# Patient Record
Sex: FEMALE | Race: WHITE | NOT HISPANIC OR LATINO | Employment: UNEMPLOYED | URBAN - METROPOLITAN AREA
[De-identification: names, ages, dates, MRNs, and addresses within clinical notes are randomized per-mention and may not be internally consistent; named-entity substitution may affect disease eponyms.]

---

## 2022-04-06 ENCOUNTER — APPOINTMENT (OUTPATIENT)
Dept: CT IMAGING | Facility: HOSPITAL | Age: 57
End: 2022-04-06

## 2022-04-06 ENCOUNTER — APPOINTMENT (OUTPATIENT)
Dept: GENERAL RADIOLOGY | Facility: HOSPITAL | Age: 57
End: 2022-04-06

## 2022-04-06 ENCOUNTER — HOSPITAL ENCOUNTER (OUTPATIENT)
Facility: HOSPITAL | Age: 57
Setting detail: OBSERVATION
LOS: 1 days | Discharge: HOME | End: 2022-04-10
Attending: INTERNAL MEDICINE | Admitting: INTERNAL MEDICINE

## 2022-04-06 DIAGNOSIS — S82.852A TRIMALLEOLAR FRACTURE OF ANKLE, CLOSED, LEFT, INITIAL ENCOUNTER: Primary | ICD-10-CM

## 2022-04-06 DIAGNOSIS — E87.6 HYPOKALEMIA: ICD-10-CM

## 2022-04-06 PROBLEM — M25.572 ACUTE LEFT ANKLE PAIN: Status: ACTIVE | Noted: 2022-04-06
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
## 2022-04-06 LAB
ABO GROUP BLD: NORMAL
ALBUMIN SERPL-MCNC: 3.4 G/DL (ref 3.5–5.2)
ALBUMIN/GLOB SERPL: 1.4 G/DL
ALP SERPL-CCNC: 91 U/L (ref 39–117)
ALT SERPL W P-5'-P-CCNC: 37 U/L (ref 1–33)
ANION GAP SERPL CALCULATED.3IONS-SCNC: 10 MMOL/L (ref 5–15)
AST SERPL-CCNC: 33 U/L (ref 1–32)
BASOPHILS # BLD AUTO: 0 10*3/MM3 (ref 0–0.2)
BASOPHILS NFR BLD AUTO: 0.4 % (ref 0–1.5)
BILIRUB SERPL-MCNC: 0.4 MG/DL (ref 0–1.2)
BLD GP AB SCN SERPL QL: NEGATIVE
BUN SERPL-MCNC: 10 MG/DL (ref 6–20)
BUN/CREAT SERPL: 13.5 (ref 7–25)
CALCIUM SPEC-SCNC: 7.5 MG/DL (ref 8.6–10.5)
CHLORIDE SERPL-SCNC: 107 MMOL/L (ref 98–107)
CO2 SERPL-SCNC: 21 MMOL/L (ref 22–29)
CREAT SERPL-MCNC: 0.74 MG/DL (ref 0.57–1)
DEPRECATED RDW RBC AUTO: 38.1 FL (ref 37–54)
EGFRCR SERPLBLD CKD-EPI 2021: 93.9 ML/MIN/1.73
EOSINOPHIL # BLD AUTO: 0.1 10*3/MM3 (ref 0–0.4)
EOSINOPHIL NFR BLD AUTO: 1.1 % (ref 0.3–6.2)
ERYTHROCYTE [DISTWIDTH] IN BLOOD BY AUTOMATED COUNT: 12.5 % (ref 12.3–15.4)
GLOBULIN UR ELPH-MCNC: 2.4 GM/DL
GLUCOSE SERPL-MCNC: 117 MG/DL (ref 65–99)
HCT VFR BLD AUTO: 36.8 % (ref 34–46.6)
HGB BLD-MCNC: 13 G/DL (ref 12–15.9)
LYMPHOCYTES # BLD AUTO: 0.9 10*3/MM3 (ref 0.7–3.1)
LYMPHOCYTES NFR BLD AUTO: 16.2 % (ref 19.6–45.3)
MAGNESIUM SERPL-MCNC: 1.6 MG/DL (ref 1.6–2.6)
MCH RBC QN AUTO: 30.7 PG (ref 26.6–33)
MCHC RBC AUTO-ENTMCNC: 35.2 G/DL (ref 31.5–35.7)
MCV RBC AUTO: 87.2 FL (ref 79–97)
MONOCYTES # BLD AUTO: 0.4 10*3/MM3 (ref 0.1–0.9)
MONOCYTES NFR BLD AUTO: 7.5 % (ref 5–12)
NEUTROPHILS NFR BLD AUTO: 4.2 10*3/MM3 (ref 1.7–7)
NEUTROPHILS NFR BLD AUTO: 74.8 % (ref 42.7–76)
NRBC BLD AUTO-RTO: 0.2 /100 WBC (ref 0–0.2)
PLATELET # BLD AUTO: 202 10*3/MM3 (ref 140–450)
PMV BLD AUTO: 8.1 FL (ref 6–12)
POTASSIUM SERPL-SCNC: 2.9 MMOL/L (ref 3.5–5.2)
PROT SERPL-MCNC: 5.8 G/DL (ref 6–8.5)
QT INTERVAL: 392 MS
RBC # BLD AUTO: 4.22 10*6/MM3 (ref 3.77–5.28)
RH BLD: POSITIVE
SARS-COV-2 RNA PNL SPEC NAA+PROBE: NOT DETECTED
SODIUM SERPL-SCNC: 138 MMOL/L (ref 136–145)
T&S EXPIRATION DATE: NORMAL
WBC NRBC COR # BLD: 5.6 10*3/MM3 (ref 3.4–10.8)

## 2022-04-06 PROCEDURE — 25010000002 ONDANSETRON PER 1 MG: Performed by: NURSE PRACTITIONER

## 2022-04-06 PROCEDURE — 36415 COLL VENOUS BLD VENIPUNCTURE: CPT | Performed by: NURSE PRACTITIONER

## 2022-04-06 PROCEDURE — 25010000002 KETOROLAC TROMETHAMINE PER 15 MG: Performed by: NURSE PRACTITIONER

## 2022-04-06 PROCEDURE — 0 POTASSIUM CHLORIDE 10 MEQ/100ML SOLUTION: Performed by: NURSE PRACTITIONER

## 2022-04-06 PROCEDURE — 86900 BLOOD TYPING SEROLOGIC ABO: CPT

## 2022-04-06 PROCEDURE — 80053 COMPREHEN METABOLIC PANEL: CPT | Performed by: NURSE PRACTITIONER

## 2022-04-06 PROCEDURE — 83735 ASSAY OF MAGNESIUM: CPT | Performed by: NURSE PRACTITIONER

## 2022-04-06 PROCEDURE — 99219 PR INITIAL OBSERVATION CARE/DAY 50 MINUTES: CPT | Performed by: NURSE PRACTITIONER

## 2022-04-06 PROCEDURE — 86901 BLOOD TYPING SEROLOGIC RH(D): CPT | Performed by: NURSE PRACTITIONER

## 2022-04-06 PROCEDURE — G0378 HOSPITAL OBSERVATION PER HR: HCPCS

## 2022-04-06 PROCEDURE — 96365 THER/PROPH/DIAG IV INF INIT: CPT

## 2022-04-06 PROCEDURE — 96361 HYDRATE IV INFUSION ADD-ON: CPT

## 2022-04-06 PROCEDURE — 86850 RBC ANTIBODY SCREEN: CPT | Performed by: NURSE PRACTITIONER

## 2022-04-06 PROCEDURE — 86901 BLOOD TYPING SEROLOGIC RH(D): CPT

## 2022-04-06 PROCEDURE — 96376 TX/PRO/DX INJ SAME DRUG ADON: CPT

## 2022-04-06 PROCEDURE — 99285 EMERGENCY DEPT VISIT HI MDM: CPT

## 2022-04-06 PROCEDURE — 25010000002 HYDROMORPHONE 1 MG/ML SOLUTION: Performed by: INTERNAL MEDICINE

## 2022-04-06 PROCEDURE — 93010 ELECTROCARDIOGRAM REPORT: CPT | Performed by: INTERNAL MEDICINE

## 2022-04-06 PROCEDURE — 73700 CT LOWER EXTREMITY W/O DYE: CPT

## 2022-04-06 PROCEDURE — 85025 COMPLETE CBC W/AUTO DIFF WBC: CPT | Performed by: NURSE PRACTITIONER

## 2022-04-06 PROCEDURE — 93005 ELECTROCARDIOGRAM TRACING: CPT | Performed by: NURSE PRACTITIONER

## 2022-04-06 PROCEDURE — U0003 INFECTIOUS AGENT DETECTION BY NUCLEIC ACID (DNA OR RNA); SEVERE ACUTE RESPIRATORY SYNDROME CORONAVIRUS 2 (SARS-COV-2) (CORONAVIRUS DISEASE [COVID-19]), AMPLIFIED PROBE TECHNIQUE, MAKING USE OF HIGH THROUGHPUT TECHNOLOGIES AS DESCRIBED BY CMS-2020-01-R: HCPCS | Performed by: INTERNAL MEDICINE

## 2022-04-06 PROCEDURE — C9803 HOPD COVID-19 SPEC COLLECT: HCPCS

## 2022-04-06 PROCEDURE — 96375 TX/PRO/DX INJ NEW DRUG ADDON: CPT

## 2022-04-06 PROCEDURE — 25010000002 HYDROMORPHONE 1 MG/ML SOLUTION

## 2022-04-06 PROCEDURE — 73600 X-RAY EXAM OF ANKLE: CPT

## 2022-04-06 PROCEDURE — 73610 X-RAY EXAM OF ANKLE: CPT

## 2022-04-06 PROCEDURE — 71045 X-RAY EXAM CHEST 1 VIEW: CPT

## 2022-04-06 PROCEDURE — 99203 OFFICE O/P NEW LOW 30 MIN: CPT | Performed by: ORTHOPAEDIC SURGERY

## 2022-04-06 PROCEDURE — 86900 BLOOD TYPING SEROLOGIC ABO: CPT | Performed by: NURSE PRACTITIONER

## 2022-04-06 RX ORDER — LEVOTHYROXINE SODIUM 0.03 MG/1
25 TABLET ORAL DAILY
COMMUNITY

## 2022-04-06 RX ORDER — HYDROCODONE BITARTRATE AND ACETAMINOPHEN 5; 325 MG/1; MG/1
1 TABLET ORAL EVERY 4 HOURS PRN
Status: DISCONTINUED | OUTPATIENT
Start: 2022-04-06 | End: 2022-04-10 | Stop reason: HOSPADM

## 2022-04-06 RX ORDER — POTASSIUM CHLORIDE 7.45 MG/ML
10 INJECTION INTRAVENOUS ONCE
Status: DISCONTINUED | OUTPATIENT
Start: 2022-04-06 | End: 2022-04-06

## 2022-04-06 RX ORDER — ADALIMUMAB 40MG/0.4ML
40 KIT SUBCUTANEOUS ONCE
COMMUNITY
End: 2022-07-07

## 2022-04-06 RX ORDER — MAGNESIUM SULFATE HEPTAHYDRATE 40 MG/ML
2 INJECTION, SOLUTION INTRAVENOUS AS NEEDED
Status: DISCONTINUED | OUTPATIENT
Start: 2022-04-06 | End: 2022-04-10 | Stop reason: HOSPADM

## 2022-04-06 RX ORDER — POTASSIUM CHLORIDE 20 MEQ/1
40 TABLET, EXTENDED RELEASE ORAL AS NEEDED
Status: DISCONTINUED | OUTPATIENT
Start: 2022-04-06 | End: 2022-04-10 | Stop reason: HOSPADM

## 2022-04-06 RX ORDER — ALUMINA, MAGNESIA, AND SIMETHICONE 2400; 2400; 240 MG/30ML; MG/30ML; MG/30ML
15 SUSPENSION ORAL EVERY 6 HOURS PRN
Status: DISCONTINUED | OUTPATIENT
Start: 2022-04-06 | End: 2022-04-10 | Stop reason: HOSPADM

## 2022-04-06 RX ORDER — BISACODYL 5 MG/1
5 TABLET, DELAYED RELEASE ORAL DAILY PRN
Status: DISCONTINUED | OUTPATIENT
Start: 2022-04-06 | End: 2022-04-10 | Stop reason: HOSPADM

## 2022-04-06 RX ORDER — SODIUM CHLORIDE 0.9 % (FLUSH) 0.9 %
10 SYRINGE (ML) INJECTION AS NEEDED
Status: DISCONTINUED | OUTPATIENT
Start: 2022-04-06 | End: 2022-04-08 | Stop reason: SDUPTHER

## 2022-04-06 RX ORDER — POLYETHYLENE GLYCOL 3350 17 G/17G
17 POWDER, FOR SOLUTION ORAL DAILY PRN
Status: DISCONTINUED | OUTPATIENT
Start: 2022-04-06 | End: 2022-04-10 | Stop reason: HOSPADM

## 2022-04-06 RX ORDER — POTASSIUM CHLORIDE 7.45 MG/ML
10 INJECTION INTRAVENOUS ONCE
Status: COMPLETED | OUTPATIENT
Start: 2022-04-06 | End: 2022-04-06

## 2022-04-06 RX ORDER — POTASSIUM CHLORIDE 7.45 MG/ML
10 INJECTION INTRAVENOUS
Status: DISCONTINUED | OUTPATIENT
Start: 2022-04-06 | End: 2022-04-10 | Stop reason: HOSPADM

## 2022-04-06 RX ORDER — ETOMIDATE 2 MG/ML
0.3 INJECTION INTRAVENOUS ONCE
Status: COMPLETED | OUTPATIENT
Start: 2022-04-06 | End: 2022-04-06

## 2022-04-06 RX ORDER — NITROGLYCERIN 0.4 MG/1
0.4 TABLET SUBLINGUAL
Status: DISCONTINUED | OUTPATIENT
Start: 2022-04-06 | End: 2022-04-10 | Stop reason: HOSPADM

## 2022-04-06 RX ORDER — ONDANSETRON 2 MG/ML
4 INJECTION INTRAMUSCULAR; INTRAVENOUS EVERY 6 HOURS PRN
Status: DISCONTINUED | OUTPATIENT
Start: 2022-04-06 | End: 2022-04-10 | Stop reason: HOSPADM

## 2022-04-06 RX ORDER — KETOROLAC TROMETHAMINE 15 MG/ML
15 INJECTION, SOLUTION INTRAMUSCULAR; INTRAVENOUS EVERY 6 HOURS PRN
Status: DISPENSED | OUTPATIENT
Start: 2022-04-06 | End: 2022-04-10

## 2022-04-06 RX ORDER — SODIUM CHLORIDE 0.9 % (FLUSH) 0.9 %
10 SYRINGE (ML) INJECTION EVERY 12 HOURS SCHEDULED
Status: DISCONTINUED | OUTPATIENT
Start: 2022-04-06 | End: 2022-04-10 | Stop reason: HOSPADM

## 2022-04-06 RX ORDER — ACETAMINOPHEN 160 MG/5ML
650 SOLUTION ORAL EVERY 4 HOURS PRN
Status: DISCONTINUED | OUTPATIENT
Start: 2022-04-06 | End: 2022-04-10 | Stop reason: HOSPADM

## 2022-04-06 RX ORDER — OXYCODONE HYDROCHLORIDE 5 MG/1
10 TABLET ORAL EVERY 4 HOURS PRN
Status: DISCONTINUED | OUTPATIENT
Start: 2022-04-06 | End: 2022-04-10 | Stop reason: HOSPADM

## 2022-04-06 RX ORDER — MAGNESIUM SULFATE 1 G/100ML
1 INJECTION INTRAVENOUS AS NEEDED
Status: DISCONTINUED | OUTPATIENT
Start: 2022-04-06 | End: 2022-04-10 | Stop reason: HOSPADM

## 2022-04-06 RX ORDER — ACETAMINOPHEN 650 MG/1
650 SUPPOSITORY RECTAL EVERY 4 HOURS PRN
Status: DISCONTINUED | OUTPATIENT
Start: 2022-04-06 | End: 2022-04-10 | Stop reason: HOSPADM

## 2022-04-06 RX ORDER — ONDANSETRON 4 MG/1
4 TABLET, FILM COATED ORAL EVERY 6 HOURS PRN
Status: DISCONTINUED | OUTPATIENT
Start: 2022-04-06 | End: 2022-04-10 | Stop reason: HOSPADM

## 2022-04-06 RX ORDER — AMOXICILLIN 250 MG
2 CAPSULE ORAL 2 TIMES DAILY
Status: DISCONTINUED | OUTPATIENT
Start: 2022-04-06 | End: 2022-04-10 | Stop reason: HOSPADM

## 2022-04-06 RX ORDER — CHOLECALCIFEROL (VITAMIN D3) 125 MCG
5 CAPSULE ORAL NIGHTLY PRN
Status: DISCONTINUED | OUTPATIENT
Start: 2022-04-06 | End: 2022-04-10 | Stop reason: HOSPADM

## 2022-04-06 RX ORDER — ONDANSETRON 2 MG/ML
4 INJECTION INTRAMUSCULAR; INTRAVENOUS ONCE
Status: COMPLETED | OUTPATIENT
Start: 2022-04-06 | End: 2022-04-06

## 2022-04-06 RX ORDER — POTASSIUM CHLORIDE 1.5 G/1.77G
40 POWDER, FOR SOLUTION ORAL AS NEEDED
Status: DISCONTINUED | OUTPATIENT
Start: 2022-04-06 | End: 2022-04-10 | Stop reason: HOSPADM

## 2022-04-06 RX ORDER — SODIUM CHLORIDE, SODIUM LACTATE, POTASSIUM CHLORIDE, CALCIUM CHLORIDE 600; 310; 30; 20 MG/100ML; MG/100ML; MG/100ML; MG/100ML
125 INJECTION, SOLUTION INTRAVENOUS CONTINUOUS
Status: DISCONTINUED | OUTPATIENT
Start: 2022-04-07 | End: 2022-04-08

## 2022-04-06 RX ORDER — BISACODYL 10 MG
10 SUPPOSITORY, RECTAL RECTAL DAILY PRN
Status: DISCONTINUED | OUTPATIENT
Start: 2022-04-06 | End: 2022-04-10 | Stop reason: HOSPADM

## 2022-04-06 RX ORDER — OXYCODONE HYDROCHLORIDE 5 MG/1
5 TABLET ORAL EVERY 4 HOURS PRN
Status: DISCONTINUED | OUTPATIENT
Start: 2022-04-06 | End: 2022-04-10 | Stop reason: HOSPADM

## 2022-04-06 RX ORDER — LANOLIN ALCOHOL/MO/W.PET/CERES
3 CREAM (GRAM) TOPICAL
COMMUNITY

## 2022-04-06 RX ORDER — ACETAMINOPHEN 325 MG/1
650 TABLET ORAL EVERY 4 HOURS PRN
Status: DISCONTINUED | OUTPATIENT
Start: 2022-04-06 | End: 2022-04-10 | Stop reason: HOSPADM

## 2022-04-06 RX ADMIN — Medication 1 MG: at 09:10

## 2022-04-06 RX ADMIN — ONDANSETRON 4 MG: 2 INJECTION INTRAMUSCULAR; INTRAVENOUS at 20:56

## 2022-04-06 RX ADMIN — ONDANSETRON 4 MG: 2 INJECTION INTRAMUSCULAR; INTRAVENOUS at 09:11

## 2022-04-06 RX ADMIN — HYDROMORPHONE HYDROCHLORIDE 0.5 MG: 1 INJECTION, SOLUTION INTRAMUSCULAR; INTRAVENOUS; SUBCUTANEOUS at 22:18

## 2022-04-06 RX ADMIN — SODIUM CHLORIDE, POTASSIUM CHLORIDE, SODIUM LACTATE AND CALCIUM CHLORIDE 75 ML/HR: 600; 310; 30; 20 INJECTION, SOLUTION INTRAVENOUS at 14:04

## 2022-04-06 RX ADMIN — Medication 10 ML: at 20:50

## 2022-04-06 RX ADMIN — HYDROMORPHONE HYDROCHLORIDE 1 MG: 1 INJECTION, SOLUTION INTRAMUSCULAR; INTRAVENOUS; SUBCUTANEOUS at 15:59

## 2022-04-06 RX ADMIN — Medication 10 ML: at 13:30

## 2022-04-06 RX ADMIN — ETOMIDATE 10 MG: 2 INJECTION, SOLUTION INTRAVENOUS at 09:56

## 2022-04-06 RX ADMIN — SENNOSIDES AND DOCUSATE SODIUM 2 TABLET: 50; 8.6 TABLET ORAL at 11:18

## 2022-04-06 RX ADMIN — POTASSIUM CHLORIDE 10 MEQ: 7.46 INJECTION, SOLUTION INTRAVENOUS at 10:28

## 2022-04-06 RX ADMIN — SODIUM CHLORIDE, POTASSIUM CHLORIDE, SODIUM LACTATE AND CALCIUM CHLORIDE 75 ML/HR: 600; 310; 30; 20 INJECTION, SOLUTION INTRAVENOUS at 23:09

## 2022-04-06 RX ADMIN — POTASSIUM CHLORIDE 40 MEQ: 20 TABLET, EXTENDED RELEASE ORAL at 15:03

## 2022-04-06 RX ADMIN — KETOROLAC TROMETHAMINE 15 MG: 15 INJECTION, SOLUTION INTRAMUSCULAR; INTRAVENOUS at 13:27

## 2022-04-06 RX ADMIN — SENNOSIDES AND DOCUSATE SODIUM 2 TABLET: 50; 8.6 TABLET ORAL at 20:49

## 2022-04-06 RX ADMIN — SODIUM CHLORIDE 1000 ML: 9 INJECTION, SOLUTION INTRAVENOUS at 09:22

## 2022-04-06 RX ADMIN — OXYCODONE 10 MG: 5 TABLET ORAL at 23:08

## 2022-04-06 RX ADMIN — HYDROCODONE BITARTRATE AND ACETAMINOPHEN 1 TABLET: 5; 325 TABLET ORAL at 20:49

## 2022-04-06 RX ADMIN — OXYCODONE 10 MG: 5 TABLET ORAL at 18:31

## 2022-04-06 RX ADMIN — ACETAMINOPHEN 650 MG: 325 TABLET ORAL at 18:20

## 2022-04-06 RX ADMIN — POTASSIUM CHLORIDE 10 MEQ: 7.46 INJECTION, SOLUTION INTRAVENOUS at 13:03

## 2022-04-06 RX ADMIN — HYDROCODONE BITARTRATE AND ACETAMINOPHEN 1 TABLET: 5; 325 TABLET ORAL at 13:26

## 2022-04-06 RX ADMIN — HYDROMORPHONE HYDROCHLORIDE 1 MG: 1 INJECTION, SOLUTION INTRAMUSCULAR; INTRAVENOUS; SUBCUTANEOUS at 09:10

## 2022-04-06 RX ADMIN — OXYCODONE 5 MG: 5 TABLET ORAL at 16:45

## 2022-04-06 NOTE — ED PROVIDER NOTES
Subjective   56-year-old  female presents emergency room via EMS for complaint of left ankle pain with obvious deformity.  Patient states that she missed a step and slipped and fell on her left ankle.  Patient states that she lives in [facility] and called 911 and the EMS crew brought her to [facility].  Onset: About an hour prior to arrival  Location: Left ankle  Duration: 1 to 2 hours  Character: Sharp pains in calf  Aggravating/Alleviating Factors: Fell down 1 step/pain medicine in route given by EMS  Radiation: Into upper left calf  Severity:  severe            Review of Systems   Constitutional: Positive for activity change.   Musculoskeletal: Positive for arthralgias and myalgias.   Skin: Positive for color change and wound.   Neurological: Positive for weakness and numbness.   All other systems reviewed and are negative.      History reviewed. No pertinent past medical history.    No Known Allergies    History reviewed. No pertinent surgical history.    History reviewed. No pertinent family history.    Social History     Socioeconomic History   • Marital status:            Objective   Physical Exam  Constitutional:       General: She is in acute distress.   HENT:      Head: Normocephalic and atraumatic.      Mouth/Throat:      Mouth: Mucous membranes are dry.      Pharynx: Oropharynx is clear.   Eyes:      Extraocular Movements: Extraocular movements intact.      Conjunctiva/sclera: Conjunctivae normal.      Pupils: Pupils are equal, round, and reactive to light.   Musculoskeletal:        Legs:    Neurological:      Mental Status: She is alert.      GCS: GCS eye subscore is 4. GCS verbal subscore is 5. GCS motor subscore is 6.      Sensory: Sensation is intact.      Motor: Weakness present.         FX Dislocation    Date/Time: 4/6/2022 9:31 AM  Performed by: Toyin Barrera APRN  Authorized by: Toyin Barrera APRN     Consent:     Consent obtained:  Verbal    Consent given  by:  Patient    Risks, benefits, and alternatives were discussed: yes      Risks discussed:  Nerve damage, pain and vascular damage    Alternatives discussed:  Referral and observation  Universal protocol:     Imaging studies available: yes      Site/side marked: yes      Immediately prior to procedure, a time out was called: yes      Patient identity confirmed:  Verbally with patient  Injury:     Injury location:  Ankle    Ankle injury location:  L ankle    Ankle fracture type: trimalleolar    Pre-procedure details:     Distal neurologic exam:  Weakness    Distal perfusion: distal pulses strong and brisk capillary refill      Range of motion: reduced    Sedation:     Sedation type:  Moderate sedation  Anesthesia:     Anesthesia method:  None  Procedure details:     Manipulation performed: yes      Skin traction used: yes      Reduction successful: yes      Immobilization:  Splint    Splint type:  Short leg    Supplies used:  Cotton padding and fiberglass  Post-procedure details:     Distal neurologic exam:  Normal    Distal perfusion: distal pulses strong and brisk capillary refill      Procedure completion:  Tolerated well, no immediate complications    Fracture management: I provided definitive fracture management                 ED Course  ED Course as of 04/06/22 1034   Wed Apr 06, 2022   1004 Dr Vora called for consult/referral. [CT]   1018 Dr Vora returns call and agrees to consult. [CT]   1026 Dr dimas at bedside to consult. [CT]   1028 Martha NP with Hospitalist group returned phone call and agrees to accept admission under Dr Orta. [CT]      ED Course User Index  [CT] Toyin Barrera, APRN      PIV obtained enroute via EMS. 1mg of dialudid given for pain relief upon my assessment.    Reduction procedure performed assisted by Fawad Ma MD.  (see above procedure note).    XR Ankle 2 View Left    Result Date: 4/6/2022  Interval reduction of trimalleolar fracture dislocation with significantly improved  alignment at all 3 fractures and reduction of anterior dislocation.  Electronically Signed By-Taran Eaton MD On:4/6/2022 10:29 AM This report was finalized on [id number] by  Taran Eaton MD.    XR Ankle 3+ View Left    Result Date: 4/6/2022  Trimalleolar fracture dislocation.  Electronically Signed By-Taran Eaton MD On:4/6/2022 9:37 AM This report was finalized on [id number] by  Taran Eaton MD.    XR Chest 1 View    Result Date: 4/6/2022  No acute cardiopulmonary abnormality.  Electronically Signed By-Taran Eaton MD On:4/6/2022 10:29 AM This report was finalized on [id number] by  Taran Eaton MD.      Labs Reviewed   COMPREHENSIVE METABOLIC PANEL - Abnormal; Notable for the following components:       Result Value    Glucose 117 (*)     Potassium 2.9 (*)     CO2 21.0 (*)     Calcium 7.5 (*)     Total Protein 5.8 (*)     Albumin 3.40 (*)     ALT (SGPT) 37 (*)     AST (SGOT) 33 (*)     All other components within normal limits    Narrative:     GFR Normal >60  Chronic Kidney Disease <60  Kidney Failure <15     CBC WITH AUTO DIFFERENTIAL - Abnormal; Notable for the following components:    Lymphocyte % 16.2 (*)     All other components within normal limits   MAGNESIUM - Normal   COVID PRE-OP / PRE-PROCEDURE SCREENING ORDER (NO ISOLATION)    Narrative:     The following orders were created for panel order COVID PRE-OP / PRE-PROCEDURE SCREENING ORDER (NO ISOLATION) - Swab, Nasopharynx.  Procedure                               Abnormality         Status                     ---------                               -----------         ------                     COVID-19,CEPHEID/MARIO,CO...[[id number]]                      In process                   Please view results for these tests on the individual orders.   COVID-19,CEPHEID/MARIO,COR/MAKAYLA/PAD/FIORELLA IN-HOUSE,NP SWAB IN TRANSPORT MEDIA 3-4 HR TAT, RT-PCR   TYPE AND SCREEN   BB ARMBAND CHECK   CBC AND DIFFERENTIAL    Narrative:     The following  orders were created for panel order CBC & Differential.  Procedure                               Abnormality         Status                     ---------                               -----------         ------                     CBC Auto Differential[[id number]]        Abnormal            Final result                 Please view results for these tests on the individual orders.     K+ level was found to be 2.9; potassium protocol replacement initiated in ED, prior to admission.    EKG obtained for pre-op purposes; rate of 71.                                           MDM    Final diagnoses:   Trimalleolar fracture of ankle, closed, left, initial encounter   Hypokalemia       ED Disposition  ED Disposition     ED Disposition   Decision to Admit    Condition   --    Comment   Level of Care: Med/Surg [1]   Admitting Physician: SHELLI PHILLIPS [[id number]]   Attending Physician: SHELLI PHILLIPS [[id number]]               No follow-up provider specified.       Medication List      No changes were made to your prescriptions during this visit.          Toyin Barrera, APRN  04/06/22 1034

## 2022-04-06 NOTE — CONSULTS
"     Patient ID: Chante Wilson is a 56 y.o. female.    Chief Complaint:    Chief Complaint   Patient presents with   • Ankle Pain       HPI:  This is a 56-year-old female who fell earlier today resulted in a closed deformity of the left ankle.  She was closed reduced and splinted in the emergency room.  No other acute complaints  Past Medical History:   Diagnosis Date   • Essential hypertension    • Hypothyroidism (acquired)    • Psoriasis    • Tobacco abuse        History reviewed. No pertinent surgical history.    Family History   Problem Relation Age of Onset   • Cancer Mother 38          Social History     Occupational History   • Not on file   Tobacco Use   • Smoking status: Current Every Day Smoker     Packs/day: 0.25     Types: Cigarettes   • Smokeless tobacco: Never Used   Vaping Use   • Vaping Use: Never used   Substance and Sexual Activity   • Alcohol use: Yes     Comment: \"occasional\"   • Drug use: Never   • Sexual activity: Defer      Review of Systems   Cardiovascular: Negative for chest pain.   Musculoskeletal: Positive for arthralgias.       Objective:    /55   Pulse 66   Temp 97.9 °F (36.6 °C) (Oral)   Resp 16   Ht 170.2 cm (67\")   Wt 86.2 kg (190 lb)   SpO2 98%   BMI 29.76 kg/m²     Physical Examination:  Left ankle is in a well fitting short leg splint toes are warm and perfused with intact gross sensation.  No pain wiggling the toes.  Calf is soft.    Imaging:  X-rays reviewed demonstrate displaced trimalleolar ankle fracture successfully closed reduced    Assessment:  Unstable left ankle fracture    Plan:   I recommend open reduction internal fixation.  We will evaluate the soft tissues in the next 24 hours to determine if surgery is An option on Friday.  Will admit for elevation and pain control.  She remained nonweightbearing.  Mechanical DVT prophylaxis and if surgery planned Friday will be n.p.o. Thursday night  Risks and benefits of surgery including bleeding, scar, infection, " stiffness, nerve tendon or artery damage, malunion,nonunion, DVT, repeat surgery including hardware removal, loss of life or limb were discussed. All questions were answered and addressed    Disclaimer: Part of this note may be an electronic transcription/translation of spoken language to printed text using the Dragon Dictation System

## 2022-04-06 NOTE — CASE MANAGEMENT/SOCIAL WORK
Discharge Planning Assessment   Mayur     Patient Name: Chante Wilson  MRN: [id number]  Today's Date: 4/6/2022    Admit Date: 4/6/2022     Discharge Needs Assessment     Row Name 04/06/22 1422       Living Environment    People in Home spouse    Name(s) of People in Home Donovan - she reports he works out of town during the week    Current Living Arrangements home    Primary Care Provided by self    Provides Primary Care For no one    Family Caregiver if Needed spouse;other (see comments)    Family Caregiver Names Pt states spouse is attempting to be able to take off of work next week to stay with her.He normally works out of town during the week. She states she thinks her son can stay with her and assist her if needed    Quality of Family Relationships supportive    Able to Return to Prior Arrangements yes       Resource/Environmental Concerns    Resource/Environmental Concerns home accessibility    Home Accessibility Concerns stairs to access bedroom or bathroom    Transportation Concerns none       Transition Planning    Patient/Family Anticipates Transition to home with family    Patient/Family Anticipated Services at Transition none    Transportation Anticipated family or friend will provide       Discharge Needs Assessment    Equipment Currently Used at Home none    Concerns to be Addressed denies needs/concerns at this time    Anticipated Changes Related to Illness inability to care for someone else    Equipment Needed After Discharge other (see comments)  Anticipate need for walker,knee scooter,  or crutches at dc    Current Discharge Risk physical impairment               Discharge Plan     Row Name 04/06/22 2966       Plan    Plan Home with family. Anticipated need for DME post-op    Patient/Family in Agreement with Plan yes    Plan Comments  spoke with patient.She confirms PCP and pharmacy. Reports she is normally independent with ADLs,and uses no dme.Lives with spouse but he works during the  week.Pt reports spouse is going to attempt to take off of work next week so he can be present to assist her.She said she believes her son can assist her if spouse is unable to do so. She said she plans to sleep on couch because bedroom is on second floor. Secure chat sent to PT Nicole Birch that navigating steps may need to be addressed prior to dc.Pt denies other dc needs or concerns at this time. Anticipates surgery on Friday.              Continued Care and Services - Admitted Since 4/6/2022    Coordination has not been started for this encounter.          Demographic Summary     Row Name 04/06/22 1421       General Information    Admission Type observation    Arrived From home    Referral Source hospital clinician/department    Reason for Consult discharge planning    Preferred Language English       Contact Information    Permission Granted to Share Info With                Functional Status     Row Name 04/06/22 1421       Functional Status    Usual Activity Tolerance good    Current Activity Tolerance poor  Not ambulating currently due to ankle fracture       Functional Status, IADL    Medications independent    Meal Preparation independent    Housekeeping independent    Laundry independent    Shopping independent                Zeynep Moeller RN, [facility]  Office: [phone]  Fax: [phone]  [email or web address]      I met with patient in room wearing PPE: mask and goggles.     Maintained distance greater than six feet and spent </=15 minutes in the room        Zeynep Moeller RN

## 2022-04-06 NOTE — SIGNIFICANT NOTE
04/06/22 1112   OTHER   Discipline physical therapist   Rehab Time/Intention   Session Not Performed unable to evaluate, medical status change;other (see comments)  (ankle fx; waiting on ortho consult)   Recommendation   PT - Next Appointment 04/07/22

## 2022-04-06 NOTE — H&P
[facility] Medicine Services      Patient Name: Chante Wilson  : 1965  MRN: [id number]  Primary Care Physician:  Alex Grullon DO  Date of admission: 2022      Subjective      Chief Complaint:  Left ankle pain    History of Present Illness: Chante Wilson is a 56 y.o. female with a history of hypertension, hypothyroidism, and psoriasis who presented to the ER at [facility] on 2022 complaining of left ankle pain. The patient states she missed a step and fell this morning. She states she cannot bear weight on her left side. She states the pain is severe and worse with movement. She denies any alleviating factors. She denies any other complaints.     The patient states she smokes a couple cigarettes daily. She reports occasional alcohol intake. She denies any illicit drug use. She reports a family history of cancer.    Workup in the ER revealed left trimalleolar fracture. The patient was given Dilaudid for pain relief. She was given conscious sedation and her fracture was reduced by the ER MD. A splint cast was applied. Orthopedic surgery was consulted. The patient was also noted to have hypokalemia so she was started on replacement. She was admitted to the Hospitalist group for further evaluation.       Review of Systems   Musculoskeletal: Positive for falls and joint pain.        Left ankle   All other systems reviewed and are negative.       Personal History     Past Medical History:   Diagnosis Date   • Essential hypertension    • Hypothyroidism (acquired)    • Psoriasis    • Tobacco abuse        History reviewed. No pertinent surgical history.    Family History: family history includes Cancer (age of onset: 38) in her mother. Otherwise pertinent FHx was reviewed and not pertinent to current issue.    Social History:  reports that she has been smoking cigarettes. She has been smoking about 0.25 packs per day. She has never used smokeless tobacco. She reports current  alcohol use. She reports that she does not use drugs.    Home Medications:  Levothyroxine, lisinopril-HCTZ, Humira       Allergies:  No Known Allergies    Objective      Vitals:   Temp:  [97.9 °F (36.6 °C)] 97.9 °F (36.6 °C)  Heart Rate:  [66-92] 69  Resp:  [15-16] 16  BP: ()/(51-73) 97/53    Physical Exam  Vitals reviewed.   HENT:      Head: Normocephalic.   Eyes:      Extraocular Movements: Extraocular movements intact.      Conjunctiva/sclera: Conjunctivae normal.      Pupils: Pupils are equal, round, and reactive to light.   Cardiovascular:      Rate and Rhythm: Normal rate and regular rhythm.      Pulses: Normal pulses.      Heart sounds: Normal heart sounds.   Pulmonary:      Effort: Pulmonary effort is normal.      Breath sounds: Normal breath sounds.   Abdominal:      General: Bowel sounds are normal. There is no distension.      Palpations: Abdomen is soft.      Tenderness: There is no abdominal tenderness.   Musculoskeletal:      Cervical back: Normal range of motion.      Right lower leg: No edema.      Left lower leg: No edema.      Comments: LLE limited ROM, splint cast in place   Skin:     General: Skin is warm and dry.      Capillary Refill: Capillary refill takes less than 2 seconds.   Neurological:      Mental Status: She is oriented to person, place, and time and easily aroused.      Comments: drowsy   Psychiatric:         Mood and Affect: Mood normal.         Behavior: Behavior normal.          Result Review    Result Review:  I have personally reviewed the results from the time of this admission to 4/6/2022 10:48 EDT and agree with these findings:  [x]  Laboratory  []  Microbiology  [x]  Radiology  [x]  EKG/Telemetry   []  Cardiology/Vascular   []  Pathology  []  Old records  []  Other:     Most notable findings include:   K 2.9, Mg 1.6    EKG:  SR    Left ankle x-ray:  Trimalleolar fracture dislocation.    Follow-up x-ray:  Interval reduction of trimalleolar fracture dislocation with  significantly improved alignment at all 3 fractures and reduction of anterior dislocation.      Assessment/Plan        Active Hospital Problems:  Active Hospital Problems    Diagnosis    • **Trimalleolar fracture of ankle, closed, left, initial encounter    • Fall    • Acute left ankle pain    • Hypokalemia    • Essential hypertension    • Hypothyroidism (acquired)    • Tobacco abuse      Plan:     Trimalleolar fracture of ankle, closed, left, initial encounter secondary to Fall / Acute left ankle pain  -s/p reduction of dislocation by ER MD  -currently in splint cast  -orthopedic surgery consulted  -elevate LLE  -apply ice as needed  -PRN analgesia     Hypokalemia  -likely secondary to diuretic  -replace per protocol and monitor     Essential hypertension, chronic  -continue ACE-I once dose verified   -hold diuretic for now  -monitor BP    Hypothyroidism (acquired)  -continue levothyroxine once dose verified     Tobacco abuse  -encourage cessation         DVT prophylaxis:  Mechanical DVT prophylaxis orders are present.    CODE STATUS:    Code Status (Patient has no pulse and is not breathing): CPR (Attempt to Resuscitate)  Medical Interventions (Patient has pulse or is breathing): Full Support    Admission Status:  I believe this patient meets inpatient status.    I discussed the patient's findings and my recommendations with patient.    This patient has been examined wearing appropriate Personal Protective Equipment. 04/06/22      Signature: Electronically signed by AURA King, 04/06/22, 10:58 AM EDT.

## 2022-04-06 NOTE — ED NOTES
Provider notified of pt's pain. Verbal order per Dr. Orta to go ahead and give 10mg of oxycodone.

## 2022-04-07 PROBLEM — R53.81 PHYSICAL DEBILITY: Status: ACTIVE | Noted: 2022-04-07

## 2022-04-07 LAB
ANION GAP SERPL CALCULATED.3IONS-SCNC: 9 MMOL/L (ref 5–15)
BUN SERPL-MCNC: 9 MG/DL (ref 6–20)
BUN/CREAT SERPL: 7.8 (ref 7–25)
CALCIUM SPEC-SCNC: 8.6 MG/DL (ref 8.6–10.5)
CHLORIDE SERPL-SCNC: 102 MMOL/L (ref 98–107)
CO2 SERPL-SCNC: 25 MMOL/L (ref 22–29)
CREAT SERPL-MCNC: 1.15 MG/DL (ref 0.57–1)
EGFRCR SERPLBLD CKD-EPI 2021: 55.3 ML/MIN/1.73
GLUCOSE SERPL-MCNC: 110 MG/DL (ref 65–99)
MAGNESIUM SERPL-MCNC: 1.8 MG/DL (ref 1.6–2.6)
POTASSIUM SERPL-SCNC: 3.6 MMOL/L (ref 3.5–5.2)
SODIUM SERPL-SCNC: 136 MMOL/L (ref 136–145)

## 2022-04-07 PROCEDURE — 96375 TX/PRO/DX INJ NEW DRUG ADDON: CPT

## 2022-04-07 PROCEDURE — 80048 BASIC METABOLIC PNL TOTAL CA: CPT | Performed by: NURSE PRACTITIONER

## 2022-04-07 PROCEDURE — 96376 TX/PRO/DX INJ SAME DRUG ADON: CPT

## 2022-04-07 PROCEDURE — 25010000002 KETOROLAC TROMETHAMINE PER 15 MG: Performed by: NURSE PRACTITIONER

## 2022-04-07 PROCEDURE — 99226 PR SBSQ OBSERVATION CARE/DAY 35 MINUTES: CPT | Performed by: INTERNAL MEDICINE

## 2022-04-07 PROCEDURE — 25010000002 DIPHENHYDRAMINE PER 50 MG: Performed by: INTERNAL MEDICINE

## 2022-04-07 PROCEDURE — 96361 HYDRATE IV INFUSION ADD-ON: CPT

## 2022-04-07 PROCEDURE — 99213 OFFICE O/P EST LOW 20 MIN: CPT | Performed by: ORTHOPAEDIC SURGERY

## 2022-04-07 PROCEDURE — 25010000002 HYDROMORPHONE 1 MG/ML SOLUTION: Performed by: INTERNAL MEDICINE

## 2022-04-07 PROCEDURE — 25010000002 MAGNESIUM SULFATE IN D5W 1G/100ML (PREMIX) 1-5 GM/100ML-% SOLUTION: Performed by: NURSE PRACTITIONER

## 2022-04-07 PROCEDURE — G0378 HOSPITAL OBSERVATION PER HR: HCPCS

## 2022-04-07 PROCEDURE — 83735 ASSAY OF MAGNESIUM: CPT | Performed by: NURSE PRACTITIONER

## 2022-04-07 RX ORDER — DIPHENHYDRAMINE HYDROCHLORIDE 50 MG/ML
25 INJECTION INTRAMUSCULAR; INTRAVENOUS EVERY 6 HOURS PRN
Status: DISCONTINUED | OUTPATIENT
Start: 2022-04-07 | End: 2022-04-10 | Stop reason: HOSPADM

## 2022-04-07 RX ADMIN — MAGNESIUM SULFATE HEPTAHYDRATE 1 G: 10 INJECTION, SOLUTION INTRAVENOUS at 07:15

## 2022-04-07 RX ADMIN — SODIUM CHLORIDE 1000 ML: 9 INJECTION, SOLUTION INTRAVENOUS at 17:35

## 2022-04-07 RX ADMIN — HYDROCODONE BITARTRATE AND ACETAMINOPHEN 1 TABLET: 5; 325 TABLET ORAL at 19:46

## 2022-04-07 RX ADMIN — OXYCODONE 10 MG: 5 TABLET ORAL at 22:42

## 2022-04-07 RX ADMIN — SODIUM CHLORIDE, POTASSIUM CHLORIDE, SODIUM LACTATE AND CALCIUM CHLORIDE 125 ML/HR: 600; 310; 30; 20 INJECTION, SOLUTION INTRAVENOUS at 20:50

## 2022-04-07 RX ADMIN — SENNOSIDES AND DOCUSATE SODIUM 2 TABLET: 50; 8.6 TABLET ORAL at 21:18

## 2022-04-07 RX ADMIN — SENNOSIDES AND DOCUSATE SODIUM 2 TABLET: 50; 8.6 TABLET ORAL at 08:12

## 2022-04-07 RX ADMIN — DIPHENHYDRAMINE HYDROCHLORIDE 25 MG: 50 INJECTION, SOLUTION INTRAMUSCULAR; INTRAVENOUS at 10:01

## 2022-04-07 RX ADMIN — KETOROLAC TROMETHAMINE 15 MG: 15 INJECTION, SOLUTION INTRAMUSCULAR; INTRAVENOUS at 13:02

## 2022-04-07 RX ADMIN — OXYCODONE 10 MG: 5 TABLET ORAL at 12:21

## 2022-04-07 RX ADMIN — SODIUM CHLORIDE, POTASSIUM CHLORIDE, SODIUM LACTATE AND CALCIUM CHLORIDE 75 ML/HR: 600; 310; 30; 20 INJECTION, SOLUTION INTRAVENOUS at 07:14

## 2022-04-07 RX ADMIN — KETOROLAC TROMETHAMINE 15 MG: 15 INJECTION, SOLUTION INTRAMUSCULAR; INTRAVENOUS at 21:18

## 2022-04-07 RX ADMIN — HYDROMORPHONE HYDROCHLORIDE 1 MG: 1 INJECTION, SOLUTION INTRAMUSCULAR; INTRAVENOUS; SUBCUTANEOUS at 13:40

## 2022-04-07 RX ADMIN — OXYCODONE 10 MG: 5 TABLET ORAL at 03:50

## 2022-04-07 RX ADMIN — POTASSIUM CHLORIDE 40 MEQ: 20 TABLET, EXTENDED RELEASE ORAL at 07:15

## 2022-04-07 RX ADMIN — Medication 10 ML: at 08:12

## 2022-04-07 NOTE — PROGRESS NOTES
[facility] Medicine Services Daily Progress Note    Patient Name: Chante Wilson  : 1965  MRN: [id number]  Primary Care Physician:  Alex Grullon DO  Date of admission: 2022      Subjective      Chief Complaint: Left ankle pain.      Patient Reports no overnight events.      Review of Systems   Constitutional: Negative.   HENT: Negative.    Eyes: Negative.    Cardiovascular: Negative.    Respiratory: Negative.    Endocrine: Negative.    Hematologic/Lymphatic: Negative.    Skin: Negative.    Musculoskeletal: Negative.    Gastrointestinal: Negative.    Genitourinary: Negative.    Neurological: Negative.    Psychiatric/Behavioral: Negative.    Allergic/Immunologic: Negative.             Objective      Vitals:   Temp:  [97.6 °F (36.4 °C)-98.1 °F (36.7 °C)] 97.6 °F (36.4 °C)  Heart Rate:  [71-99] 77  Resp:  [13-16] 16  BP: ()/(55-68) 86/58    Physical Exam  Vitals and nursing note reviewed.   Constitutional:       General: She is not in acute distress.  HENT:      Head: Normocephalic and atraumatic.      Nose: Nose normal. No congestion or rhinorrhea.      Mouth/Throat:      Mouth: Mucous membranes are moist.      Pharynx: Oropharynx is clear. No oropharyngeal exudate or posterior oropharyngeal erythema.   Eyes:      Pupils: Pupils are equal, round, and reactive to light.   Cardiovascular:      Pulses: Normal pulses.      Heart sounds: Normal heart sounds. No murmur heard.    No friction rub. No gallop.      Comments: S1 and S2 present.  No tachycardia.  Pulmonary:      Effort: No respiratory distress.      Breath sounds: No wheezing, rhonchi or rales.   Chest:      Chest wall: No tenderness.   Abdominal:      General: Abdomen is flat. Bowel sounds are normal. There is no distension.      Palpations: Abdomen is soft.      Tenderness: There is no right CVA tenderness.   Musculoskeletal:         General: Tenderness present. No swelling, deformity or signs of injury.      Cervical  back: Neck supple. No tenderness.      Right lower leg: No edema.      Left lower leg: No edema.      Comments: Positive left ankle tenderness.   Skin:     Capillary Refill: Capillary refill takes less than 2 seconds.      Coloration: Skin is not jaundiced.      Findings: No bruising, lesion or rash.   Neurological:      Mental Status: She is alert.      Comments: No facial asymmetry noted.  Gait and station not tested.   Psychiatric:      Comments: No agitation.               Result Review    Result Review:  I have personally reviewed the results from the time of this admission to 4/7/2022 16:54 EDT and agree with these findings:  [x]  Laboratory  [x]  Microbiology  [x]  Radiology  []  EKG/Telemetry   []  Cardiology/Vascular   []  Pathology  []  Old records  []  Other:  Most notable findings include:      Radiology Results (last 21 days)    Procedure Component Value Units Date/Time   CT Lower Extremity Left Without Contrast [[id number]] Srinath as Reviewed   Order Status: Completed Collected: 04/06/22 1300    Updated: 04/06/22 1326   Narrative:     CT LOWER EXTREMITY LEFT WO CONTRAST     Date of Exam: 4/6/2022 11:39 EDT     Indication: Fracture, ankle.     Comparison Exams: Ankle radiograph 4/6/2022     Technique: Contiguous axial images obtained through the ankle. Coronal and sagittal reconstructions were performed. Automated exposure control and iterative reconstruction methods were used.     FINDINGS:   There is improved positioning of the ankle there is now anatomic alignment of the tibiotalar joint. There is a comminuted intra-articular fracture of the posterior malleolus. The fracture fragments measure about 1.8 x 1.1 cm on sagittal view. There are   superiorly displaced by 4 mm. There is a small, 2 mm osseous body in the tibiotalar joint space centrally. There is a displaced fracture of the medial malleolus. The medial mortise is widened and disrupted. The fracture fragment is displaced lateral to   the tibia by  about 6 mm. There is an oblique fracture the distal fibula. It is obliquely oriented and slightly comminuted along its superior extent. It enters ankle mortise. The distal fracture fragment is displaced posterior to the proximal fracture   fragment by 3 mm. There is a nondisplaced oblique fracture of the posterior medial malleolus which is congruent with a comminuted displaced posterior malleolar fracture.     There is soft tissue edema. There is suspected disruption of the syndesmotic ligaments. The soft tissues would be better evaluated with MRI examination. The flexor, extensor, peroneal and Achilles tendons are intact. There is a small os trigonum.         Impression:     There is a trimalleolar fracture with improved alignment compared to the acute film. The ankle mortise is disrupted and widened medially. There is a tiny 3 mm intra-articular fracture fragment at the tibiotalar joint.     Electronically Signed: Charo Jiang MD 4/6/2022 13:24 EDT           Assessment/Plan    From previous note and with minor updates.  Brief Patient Summary:  Patient is a 56 y.o. female with a history of psoriasis, hypertension and hypothyroidism who presented to the ER at [facility] on 4/6/2022 complaining of left ankle pain. The patient states she missed a step and fell this morning. She states she cannot bear weight on her left side. She states the pain is severe and worse with movement. She denies any alleviating factors. She denies any other complaints.   The patient states she smokes a couple cigarettes daily. She reports occasional alcohol intake. She denies any illicit drug use. She reports a family history of cancer.  Patient was seen in the emergency room and workup in the ER revealed left trimalleolar fracture. The patient was given Dilaudid for pain relief. She was given conscious sedation and her fracture was reduced by the ER MD. A splint cast was applied. Orthopedic surgery was consulted. The patient was  also noted to have hypokalemia so she was started on replacement. She was admitted to the Hospitalist group for further evaluation.   Orthopedic surgery consult was completed.        senna-docusate sodium, 2 tablet, Oral, BID  sodium chloride, 10 mL, Intravenous, Q12H       lactated ringers, 75 mL/hr, Last Rate: 75 mL/hr (04/07/22 0714)         Active Hospital Problems:  Active Hospital Problems    Diagnosis    • **Trimalleolar fracture of ankle, closed, left, initial encounter  Follow orthopedic surgery recommendations.      • Acute left ankle pain  Treat with pain control.      • Acute hypokalemia  Replete.      • Physical debility  Treat with PT OT after surgery.      • Fall    • Primary hypertension  Stable.      • Hypothyroidism (acquired)  Treat with Synthroid.      • Tobacco use disorder  Treat with nicotine patch.  Complete tobacco cessation counseling.          Continue appropriate patient's home medications for other chronic medical conditions.  Continue the present level of care.  Patient and family agreed with the plan of care.      DVT prophylaxis:  Mechanical DVT prophylaxis orders are present.    CODE STATUS:    Code Status (Patient has no pulse and is not breathing): CPR (Attempt to Resuscitate)  Medical Interventions (Patient has pulse or is breathing): Full Support      Disposition: Pending patient's clinical improvement.      This patient has been examined wearing appropriate Personal Protective Equipment and discussed with hospital infection control department, [facility] department, infectious disease specialist and pulmonologist. 04/07/22      Electronically signed by Charles Orta MD, FACP, 04/07/22, 16:54 EDT.      [facility] Hospitalist Team

## 2022-04-07 NOTE — PLAN OF CARE
"Goal Outcome Evaluation:  Plan of Care Reviewed With: patient         Pt had itching without rash present and continues to have severe pain \"aching\" in left ankle/foot after given oral pain medication, IV medications utilized. MD Dr. Orta notified on rounds. VSS, spouse at bedside, no concerns at this time.     "

## 2022-04-07 NOTE — H&P (VIEW-ONLY)
"     Patient ID: Chante Wilson is a 56 y.o. female.  Pain control no events overnight      Objective:    /56 (BP Location: Right arm, Patient Position: Lying)   Pulse 71   Temp 98.1 °F (36.7 °C) (Oral)   Resp 13   Ht 167.6 cm (66\")   Wt 90.7 kg (200 lb)   SpO2 99%   BMI 32.28 kg/m²     Physical Examination:  Left leg demonstrates a splint in position minimal swelling present no blisters calf soft  Sensory and motor exam are intact in all distributions. Dorsalis pedis and posterior tibialis pulses are palpable and capillary refill is less than two seconds to all digits.       Imaging:      Assessment:    Unstable left ankle fracture  Plan:  Continue ice elevation nonweightbearing.  Mechanical DVT prophylaxis plan for surgery tomorrow n.p.o. at midnight    Procedures  "

## 2022-04-07 NOTE — NURSING NOTE
Pt BP 88/52, staying in mid 80s improving from 77/46, laying flat, 02 96% on 2L, RR 18 HR 77. Pt has been alert and oriented, easily aroused but drowsy. NS at 75. Notified Dr. Orta, new orders received.

## 2022-04-08 ENCOUNTER — APPOINTMENT (OUTPATIENT)
Dept: GENERAL RADIOLOGY | Facility: HOSPITAL | Age: 57
End: 2022-04-08

## 2022-04-08 ENCOUNTER — ANESTHESIA EVENT (OUTPATIENT)
Dept: PERIOP | Facility: HOSPITAL | Age: 57
End: 2022-04-08

## 2022-04-08 ENCOUNTER — ANESTHESIA (OUTPATIENT)
Dept: PERIOP | Facility: HOSPITAL | Age: 57
End: 2022-04-08

## 2022-04-08 LAB
ANION GAP SERPL CALCULATED.3IONS-SCNC: 10 MMOL/L (ref 5–15)
ANION GAP SERPL CALCULATED.3IONS-SCNC: 12 MMOL/L (ref 5–15)
BASOPHILS # BLD AUTO: 0 10*3/MM3 (ref 0–0.2)
BASOPHILS NFR BLD AUTO: 0.2 % (ref 0–1.5)
BUN SERPL-MCNC: 7 MG/DL (ref 6–20)
BUN SERPL-MCNC: 8 MG/DL (ref 6–20)
BUN/CREAT SERPL: 6.9 (ref 7–25)
BUN/CREAT SERPL: 8.6 (ref 7–25)
CALCIUM SPEC-SCNC: 8.2 MG/DL (ref 8.6–10.5)
CALCIUM SPEC-SCNC: 8.4 MG/DL (ref 8.6–10.5)
CHLORIDE SERPL-SCNC: 106 MMOL/L (ref 98–107)
CHLORIDE SERPL-SCNC: 106 MMOL/L (ref 98–107)
CO2 SERPL-SCNC: 21 MMOL/L (ref 22–29)
CO2 SERPL-SCNC: 22 MMOL/L (ref 22–29)
CREAT SERPL-MCNC: 0.93 MG/DL (ref 0.57–1)
CREAT SERPL-MCNC: 1.02 MG/DL (ref 0.57–1)
DEPRECATED RDW RBC AUTO: 38.9 FL (ref 37–54)
EGFRCR SERPLBLD CKD-EPI 2021: 63.9 ML/MIN/1.73
EGFRCR SERPLBLD CKD-EPI 2021: 71.4 ML/MIN/1.73
EOSINOPHIL # BLD AUTO: 0 10*3/MM3 (ref 0–0.4)
EOSINOPHIL NFR BLD AUTO: 0.1 % (ref 0.3–6.2)
ERYTHROCYTE [DISTWIDTH] IN BLOOD BY AUTOMATED COUNT: 12.4 % (ref 12.3–15.4)
GLUCOSE SERPL-MCNC: 166 MG/DL (ref 65–99)
GLUCOSE SERPL-MCNC: 95 MG/DL (ref 65–99)
HCT VFR BLD AUTO: 33.1 % (ref 34–46.6)
HGB BLD-MCNC: 11.6 G/DL (ref 12–15.9)
LYMPHOCYTES # BLD AUTO: 0.3 10*3/MM3 (ref 0.7–3.1)
LYMPHOCYTES NFR BLD AUTO: 8.9 % (ref 19.6–45.3)
MAGNESIUM SERPL-MCNC: 2 MG/DL (ref 1.6–2.6)
MCH RBC QN AUTO: 30.9 PG (ref 26.6–33)
MCHC RBC AUTO-ENTMCNC: 35.1 G/DL (ref 31.5–35.7)
MCV RBC AUTO: 88 FL (ref 79–97)
MONOCYTES # BLD AUTO: 0 10*3/MM3 (ref 0.1–0.9)
MONOCYTES NFR BLD AUTO: 1.1 % (ref 5–12)
NEUTROPHILS NFR BLD AUTO: 3 10*3/MM3 (ref 1.7–7)
NEUTROPHILS NFR BLD AUTO: 89.7 % (ref 42.7–76)
NRBC BLD AUTO-RTO: 0 /100 WBC (ref 0–0.2)
PLATELET # BLD AUTO: 180 10*3/MM3 (ref 140–450)
PMV BLD AUTO: 8.5 FL (ref 6–12)
POTASSIUM SERPL-SCNC: 3.6 MMOL/L (ref 3.5–5.2)
POTASSIUM SERPL-SCNC: 4.2 MMOL/L (ref 3.5–5.2)
RBC # BLD AUTO: 3.76 10*6/MM3 (ref 3.77–5.28)
SODIUM SERPL-SCNC: 137 MMOL/L (ref 136–145)
SODIUM SERPL-SCNC: 140 MMOL/L (ref 136–145)
WBC NRBC COR # BLD: 3.3 10*3/MM3 (ref 3.4–10.8)
WHOLE BLOOD HOLD SPECIMEN: NORMAL

## 2022-04-08 PROCEDURE — 25010000002 KETOROLAC TROMETHAMINE PER 15 MG: Performed by: NURSE PRACTITIONER

## 2022-04-08 PROCEDURE — 25010000002 CEFAZOLIN PER 500 MG: Performed by: ORTHOPAEDIC SURGERY

## 2022-04-08 PROCEDURE — 83735 ASSAY OF MAGNESIUM: CPT | Performed by: NURSE PRACTITIONER

## 2022-04-08 PROCEDURE — G0378 HOSPITAL OBSERVATION PER HR: HCPCS

## 2022-04-08 PROCEDURE — 25010000002 DEXAMETHASONE PER 1 MG: Performed by: ANESTHESIOLOGIST ASSISTANT

## 2022-04-08 PROCEDURE — C1713 ANCHOR/SCREW BN/BN,TIS/BN: HCPCS | Performed by: ORTHOPAEDIC SURGERY

## 2022-04-08 PROCEDURE — 85025 COMPLETE CBC W/AUTO DIFF WBC: CPT | Performed by: ORTHOPAEDIC SURGERY

## 2022-04-08 PROCEDURE — 25010000002 FENTANYL CITRATE (PF) 50 MCG/ML SOLUTION: Performed by: ANESTHESIOLOGY

## 2022-04-08 PROCEDURE — 73610 X-RAY EXAM OF ANKLE: CPT

## 2022-04-08 PROCEDURE — 76000 FLUOROSCOPY <1 HR PHYS/QHP: CPT

## 2022-04-08 PROCEDURE — 25010000002 ROPIVACAINE PER 1 MG: Performed by: ANESTHESIOLOGY

## 2022-04-08 PROCEDURE — 96361 HYDRATE IV INFUSION ADD-ON: CPT

## 2022-04-08 PROCEDURE — 80048 BASIC METABOLIC PNL TOTAL CA: CPT | Performed by: INTERNAL MEDICINE

## 2022-04-08 PROCEDURE — 96376 TX/PRO/DX INJ SAME DRUG ADON: CPT

## 2022-04-08 PROCEDURE — 76942 ECHO GUIDE FOR BIOPSY: CPT | Performed by: ORTHOPAEDIC SURGERY

## 2022-04-08 PROCEDURE — 25010000002 HYDROMORPHONE 1 MG/ML SOLUTION: Performed by: INTERNAL MEDICINE

## 2022-04-08 PROCEDURE — 80048 BASIC METABOLIC PNL TOTAL CA: CPT | Performed by: ORTHOPAEDIC SURGERY

## 2022-04-08 PROCEDURE — 25010000002 PROPOFOL 200 MG/20ML EMULSION: Performed by: ANESTHESIOLOGIST ASSISTANT

## 2022-04-08 PROCEDURE — 27822 TREATMENT OF ANKLE FRACTURE: CPT | Performed by: ORTHOPAEDIC SURGERY

## 2022-04-08 PROCEDURE — 99226 PR SBSQ OBSERVATION CARE/DAY 35 MINUTES: CPT | Performed by: INTERNAL MEDICINE

## 2022-04-08 PROCEDURE — 25010000002 MIDAZOLAM PER 1 MG: Performed by: ANESTHESIOLOGY

## 2022-04-08 PROCEDURE — 36415 COLL VENOUS BLD VENIPUNCTURE: CPT | Performed by: INTERNAL MEDICINE

## 2022-04-08 DEVICE — SCRW CANN SD/ST PT 3.5X50MM: Type: IMPLANTABLE DEVICE | Site: ANKLE | Status: FUNCTIONAL

## 2022-04-08 DEVICE — SCRW PERIART S/TAP HD/2.7MM 3.5X14MM: Type: IMPLANTABLE DEVICE | Site: ANKLE | Status: FUNCTIONAL

## 2022-04-08 DEVICE — SCRW CORT PERIART LP S/TAP HD2.7  3.5X20: Type: IMPLANTABLE DEVICE | Site: ANKLE | Status: FUNCTIONAL

## 2022-04-08 DEVICE — PLT FIB PERIART LK D/L 6H 106 LT: Type: IMPLANTABLE DEVICE | Site: ANKLE | Status: FUNCTIONAL

## 2022-04-08 DEVICE — SCRW ULS LK PT S/TAP 2.7X14MM: Type: IMPLANTABLE DEVICE | Site: ANKLE | Status: FUNCTIONAL

## 2022-04-08 DEVICE — SCRW PERIART S/TAP HD/2.7MM 3.5X16MM: Type: IMPLANTABLE DEVICE | Site: ANKLE | Status: FUNCTIONAL

## 2022-04-08 DEVICE — SCRW ULS LK 2.7X20MM: Type: IMPLANTABLE DEVICE | Site: ANKLE | Status: FUNCTIONAL

## 2022-04-08 DEVICE — SCRW ULS LK 2.7X18MM: Type: IMPLANTABLE DEVICE | Site: ANKLE | Status: FUNCTIONAL

## 2022-04-08 DEVICE — IMPLANTABLE DEVICE: Type: IMPLANTABLE DEVICE | Site: ANKLE | Status: FUNCTIONAL

## 2022-04-08 RX ORDER — EPHEDRINE SULFATE 5 MG/ML
INJECTION INTRAVENOUS AS NEEDED
Status: DISCONTINUED | OUTPATIENT
Start: 2022-04-08 | End: 2022-04-08 | Stop reason: SURG

## 2022-04-08 RX ORDER — PHENYLEPHRINE HCL IN 0.9% NACL 1 MG/10 ML
SYRINGE (ML) INTRAVENOUS AS NEEDED
Status: DISCONTINUED | OUTPATIENT
Start: 2022-04-08 | End: 2022-04-08 | Stop reason: SURG

## 2022-04-08 RX ORDER — MORPHINE SULFATE 2 MG/ML
1 INJECTION, SOLUTION INTRAMUSCULAR; INTRAVENOUS EVERY 4 HOURS PRN
Status: DISCONTINUED | OUTPATIENT
Start: 2022-04-08 | End: 2022-04-10 | Stop reason: HOSPADM

## 2022-04-08 RX ORDER — DROPERIDOL 2.5 MG/ML
0.62 INJECTION, SOLUTION INTRAMUSCULAR; INTRAVENOUS ONCE AS NEEDED
Status: DISCONTINUED | OUTPATIENT
Start: 2022-04-08 | End: 2022-04-08 | Stop reason: HOSPADM

## 2022-04-08 RX ORDER — NALOXONE HCL 0.4 MG/ML
0.4 VIAL (ML) INJECTION AS NEEDED
Status: DISCONTINUED | OUTPATIENT
Start: 2022-04-08 | End: 2022-04-08 | Stop reason: HOSPADM

## 2022-04-08 RX ORDER — LIDOCAINE HYDROCHLORIDE 10 MG/ML
INJECTION, SOLUTION EPIDURAL; INFILTRATION; INTRACAUDAL; PERINEURAL AS NEEDED
Status: DISCONTINUED | OUTPATIENT
Start: 2022-04-08 | End: 2022-04-08 | Stop reason: SURG

## 2022-04-08 RX ORDER — FLUMAZENIL 0.1 MG/ML
0.2 INJECTION INTRAVENOUS AS NEEDED
Status: DISCONTINUED | OUTPATIENT
Start: 2022-04-08 | End: 2022-04-08 | Stop reason: HOSPADM

## 2022-04-08 RX ORDER — ROPIVACAINE HYDROCHLORIDE 5 MG/ML
INJECTION, SOLUTION EPIDURAL; INFILTRATION; PERINEURAL
Status: COMPLETED | OUTPATIENT
Start: 2022-04-08 | End: 2022-04-08

## 2022-04-08 RX ORDER — NALOXONE HCL 0.4 MG/ML
0.4 VIAL (ML) INJECTION
Status: DISCONTINUED | OUTPATIENT
Start: 2022-04-08 | End: 2022-04-10 | Stop reason: HOSPADM

## 2022-04-08 RX ORDER — SODIUM CHLORIDE 0.9 % (FLUSH) 0.9 %
3 SYRINGE (ML) INJECTION EVERY 12 HOURS SCHEDULED
Status: DISCONTINUED | OUTPATIENT
Start: 2022-04-08 | End: 2022-04-10 | Stop reason: HOSPADM

## 2022-04-08 RX ORDER — MIDAZOLAM HYDROCHLORIDE 1 MG/ML
INJECTION INTRAMUSCULAR; INTRAVENOUS
Status: COMPLETED | OUTPATIENT
Start: 2022-04-08 | End: 2022-04-08

## 2022-04-08 RX ORDER — DIPHENHYDRAMINE HYDROCHLORIDE 50 MG/ML
12.5 INJECTION INTRAMUSCULAR; INTRAVENOUS AS NEEDED
Status: DISCONTINUED | OUTPATIENT
Start: 2022-04-08 | End: 2022-04-08 | Stop reason: HOSPADM

## 2022-04-08 RX ORDER — ONDANSETRON 2 MG/ML
4 INJECTION INTRAMUSCULAR; INTRAVENOUS EVERY 6 HOURS PRN
Status: DISCONTINUED | OUTPATIENT
Start: 2022-04-08 | End: 2022-04-08 | Stop reason: SDUPTHER

## 2022-04-08 RX ORDER — EPHEDRINE SULFATE 5 MG/ML
5 INJECTION INTRAVENOUS ONCE AS NEEDED
Status: DISCONTINUED | OUTPATIENT
Start: 2022-04-08 | End: 2022-04-08 | Stop reason: HOSPADM

## 2022-04-08 RX ORDER — LABETALOL HYDROCHLORIDE 5 MG/ML
5 INJECTION, SOLUTION INTRAVENOUS
Status: DISCONTINUED | OUTPATIENT
Start: 2022-04-08 | End: 2022-04-08 | Stop reason: HOSPADM

## 2022-04-08 RX ORDER — GINSENG 100 MG
CAPSULE ORAL AS NEEDED
Status: DISCONTINUED | OUTPATIENT
Start: 2022-04-08 | End: 2022-04-08 | Stop reason: HOSPADM

## 2022-04-08 RX ORDER — FENTANYL CITRATE 50 UG/ML
25 INJECTION, SOLUTION INTRAMUSCULAR; INTRAVENOUS
Status: DISCONTINUED | OUTPATIENT
Start: 2022-04-08 | End: 2022-04-08 | Stop reason: HOSPADM

## 2022-04-08 RX ORDER — PROPOFOL 10 MG/ML
INJECTION, EMULSION INTRAVENOUS AS NEEDED
Status: DISCONTINUED | OUTPATIENT
Start: 2022-04-08 | End: 2022-04-08 | Stop reason: SURG

## 2022-04-08 RX ORDER — FENTANYL CITRATE 50 UG/ML
50 INJECTION, SOLUTION INTRAMUSCULAR; INTRAVENOUS
Status: DISCONTINUED | OUTPATIENT
Start: 2022-04-08 | End: 2022-04-08 | Stop reason: HOSPADM

## 2022-04-08 RX ORDER — SODIUM CHLORIDE 0.9 % (FLUSH) 0.9 %
3-10 SYRINGE (ML) INJECTION AS NEEDED
Status: DISCONTINUED | OUTPATIENT
Start: 2022-04-08 | End: 2022-04-10 | Stop reason: HOSPADM

## 2022-04-08 RX ORDER — OXYCODONE HCL 20 MG/1
20 TABLET, FILM COATED, EXTENDED RELEASE ORAL EVERY 12 HOURS SCHEDULED
Status: DISCONTINUED | OUTPATIENT
Start: 2022-04-08 | End: 2022-04-10 | Stop reason: HOSPADM

## 2022-04-08 RX ORDER — ONDANSETRON 2 MG/ML
4 INJECTION INTRAMUSCULAR; INTRAVENOUS ONCE AS NEEDED
Status: DISCONTINUED | OUTPATIENT
Start: 2022-04-08 | End: 2022-04-08 | Stop reason: HOSPADM

## 2022-04-08 RX ORDER — DEXAMETHASONE SODIUM PHOSPHATE 4 MG/ML
INJECTION, SOLUTION INTRA-ARTICULAR; INTRALESIONAL; INTRAMUSCULAR; INTRAVENOUS; SOFT TISSUE AS NEEDED
Status: DISCONTINUED | OUTPATIENT
Start: 2022-04-08 | End: 2022-04-08 | Stop reason: SURG

## 2022-04-08 RX ORDER — ONDANSETRON 4 MG/1
4 TABLET, FILM COATED ORAL EVERY 6 HOURS PRN
Status: DISCONTINUED | OUTPATIENT
Start: 2022-04-08 | End: 2022-04-08 | Stop reason: SDUPTHER

## 2022-04-08 RX ORDER — HYDRALAZINE HYDROCHLORIDE 20 MG/ML
5 INJECTION INTRAMUSCULAR; INTRAVENOUS
Status: DISCONTINUED | OUTPATIENT
Start: 2022-04-08 | End: 2022-04-08 | Stop reason: HOSPADM

## 2022-04-08 RX ORDER — SODIUM CHLORIDE 9 MG/ML
75 INJECTION, SOLUTION INTRAVENOUS CONTINUOUS
Status: DISCONTINUED | OUTPATIENT
Start: 2022-04-08 | End: 2022-04-10 | Stop reason: HOSPADM

## 2022-04-08 RX ORDER — FENTANYL CITRATE 50 UG/ML
100 INJECTION, SOLUTION INTRAMUSCULAR; INTRAVENOUS
Status: DISCONTINUED | OUTPATIENT
Start: 2022-04-08 | End: 2022-04-08 | Stop reason: HOSPADM

## 2022-04-08 RX ORDER — DEXAMETHASONE SODIUM PHOSPHATE 4 MG/ML
INJECTION, SOLUTION INTRA-ARTICULAR; INTRALESIONAL; INTRAMUSCULAR; INTRAVENOUS; SOFT TISSUE
Status: COMPLETED | OUTPATIENT
Start: 2022-04-08 | End: 2022-04-08

## 2022-04-08 RX ORDER — FENTANYL CITRATE 50 UG/ML
INJECTION, SOLUTION INTRAMUSCULAR; INTRAVENOUS
Status: COMPLETED | OUTPATIENT
Start: 2022-04-08 | End: 2022-04-08

## 2022-04-08 RX ORDER — DROPERIDOL 2.5 MG/ML
1.25 INJECTION, SOLUTION INTRAMUSCULAR; INTRAVENOUS ONCE AS NEEDED
Status: DISCONTINUED | OUTPATIENT
Start: 2022-04-08 | End: 2022-04-08 | Stop reason: HOSPADM

## 2022-04-08 RX ADMIN — SENNOSIDES AND DOCUSATE SODIUM 2 TABLET: 50; 8.6 TABLET ORAL at 20:48

## 2022-04-08 RX ADMIN — HYDROMORPHONE HYDROCHLORIDE 1 MG: 1 INJECTION, SOLUTION INTRAMUSCULAR; INTRAVENOUS; SUBCUTANEOUS at 10:37

## 2022-04-08 RX ADMIN — Medication 200 MCG: at 15:07

## 2022-04-08 RX ADMIN — WATER 2 G: 1000 INJECTION, SOLUTION INTRAVENOUS at 20:48

## 2022-04-08 RX ADMIN — Medication 150 MCG: at 15:43

## 2022-04-08 RX ADMIN — DEXAMETHASONE SODIUM PHOSPHATE 4 MG: 4 INJECTION, SOLUTION INTRAMUSCULAR; INTRAVENOUS at 15:11

## 2022-04-08 RX ADMIN — SODIUM CHLORIDE, POTASSIUM CHLORIDE, SODIUM LACTATE AND CALCIUM CHLORIDE 125 ML/HR: 600; 310; 30; 20 INJECTION, SOLUTION INTRAVENOUS at 04:50

## 2022-04-08 RX ADMIN — Medication 100 MCG: at 15:59

## 2022-04-08 RX ADMIN — ACETAMINOPHEN 650 MG: 325 TABLET ORAL at 02:59

## 2022-04-08 RX ADMIN — KETOROLAC TROMETHAMINE 15 MG: 15 INJECTION, SOLUTION INTRAMUSCULAR; INTRAVENOUS at 04:50

## 2022-04-08 RX ADMIN — EPHEDRINE SULFATE 10 MG: 5 INJECTION INTRAVENOUS at 15:10

## 2022-04-08 RX ADMIN — Medication 100 MCG: at 15:52

## 2022-04-08 RX ADMIN — CEFAZOLIN SODIUM 2 G: 1 INJECTION, POWDER, FOR SOLUTION INTRAMUSCULAR; INTRAVENOUS at 15:07

## 2022-04-08 RX ADMIN — OXYCODONE HYDROCHLORIDE 20 MG: 20 TABLET, FILM COATED, EXTENDED RELEASE ORAL at 20:48

## 2022-04-08 RX ADMIN — ROPIVACAINE HYDROCHLORIDE 60 ML: 5 INJECTION EPIDURAL; INFILTRATION; PERINEURAL at 14:52

## 2022-04-08 RX ADMIN — EPHEDRINE SULFATE 10 MG: 5 INJECTION INTRAVENOUS at 15:18

## 2022-04-08 RX ADMIN — Medication 200 MCG: at 15:18

## 2022-04-08 RX ADMIN — SODIUM CHLORIDE 75 ML/HR: 9 INJECTION, SOLUTION INTRAVENOUS at 18:13

## 2022-04-08 RX ADMIN — DEXAMETHASONE SODIUM PHOSPHATE 8 MG: 4 INJECTION, SOLUTION INTRA-ARTICULAR; INTRALESIONAL; INTRAMUSCULAR; INTRAVENOUS; SOFT TISSUE at 14:52

## 2022-04-08 RX ADMIN — MIDAZOLAM 2 MG: 1 INJECTION INTRAMUSCULAR; INTRAVENOUS at 14:52

## 2022-04-08 RX ADMIN — Medication 10 ML: at 08:08

## 2022-04-08 RX ADMIN — Medication 100 MCG: at 15:25

## 2022-04-08 RX ADMIN — LIDOCAINE HYDROCHLORIDE 40 MG: 10 INJECTION, SOLUTION EPIDURAL; INFILTRATION; INTRACAUDAL at 15:03

## 2022-04-08 RX ADMIN — HYDROCODONE BITARTRATE AND ACETAMINOPHEN 1 TABLET: 5; 325 TABLET ORAL at 00:10

## 2022-04-08 RX ADMIN — Medication 150 MCG: at 15:34

## 2022-04-08 RX ADMIN — EPHEDRINE SULFATE 10 MG: 5 INJECTION INTRAVENOUS at 15:25

## 2022-04-08 RX ADMIN — FENTANYL CITRATE 100 MCG: 50 INJECTION, SOLUTION INTRAMUSCULAR; INTRAVENOUS at 14:52

## 2022-04-08 RX ADMIN — PROPOFOL 200 MG: 10 INJECTION, EMULSION INTRAVENOUS at 15:03

## 2022-04-08 RX ADMIN — SODIUM CHLORIDE, POTASSIUM CHLORIDE, SODIUM LACTATE AND CALCIUM CHLORIDE 125 ML/HR: 600; 310; 30; 20 INJECTION, SOLUTION INTRAVENOUS at 12:37

## 2022-04-08 NOTE — ANESTHESIA PROCEDURE NOTES
Peripheral Block      Patient location during procedure: pre-op  Start time: 4/8/2022 2:43 PM  Stop time: 4/8/2022 2:53 PM  Performed by  Anesthesiologist: Ian Blake MD  Assisted by: Artem Cordoba RN  Preanesthetic Checklist  Completed: patient identified, IV checked, site marked, risks and benefits discussed, surgical consent, monitors and equipment checked, pre-op evaluation and timeout performed  Prep:  Pt Position: supine  Sterile barriers:cap, mask, gloves and washed/disinfected hands  Prep: ChloraPrep  Patient monitoring: blood pressure monitoring, continuous pulse oximetry and EKG  Procedure    Sedation: yes  Performed under: local infiltration  Guidance:ultrasound guided and nerve stimulator    ULTRASOUND INTERPRETATION. Using ultrasound guidance a 20 G gauge needle was placed in close proximity to the nerve, at which point, under ultrasound guidance anesthetic was injected in the area of the nerve and spread of the anesthesia was seen on ultrasound in close proximity thereto.  There were no abnormalities seen on ultrasound; a digital image was taken; and the patient tolerated the procedure with no complications. Images:still images obtained, printed/placed on chart  Loss of twitch: 0.5 mA  Laterality:left  Block Type:adductor canal block and popliteal  Injection Technique:single-shot  Needle Type:echogenic  Needle Gauge:20 G  Resistance on Injection: none  Sedation medications used: midazolam (VERSED) injection, 2 mg  fentaNYL citrate (PF) (SUBLIMAZE) injection, 100 mcg  Medications Used: dexamethasone (DECADRON) injection, 8 mg  ropivacaine (NAROPIN) 0.5 % injection, 60 mL  Med administered at 4/8/2022 2:52 PM      Medications  Comment:Popliteal 30ml, adductor canal 30ml    Post Assessment  Injection Assessment: negative aspiration for heme, no paresthesia on injection and incremental injection  Patient Tolerance:comfortable throughout block  Complications:no

## 2022-04-08 NOTE — ANESTHESIA POSTPROCEDURE EVALUATION
Patient: Chante Wilson    Procedure Summary     Date: 04/08/22 Room / Location: [facility] OR [address] [place] [address] MAIN OR    Anesthesia Start: 1500 Anesthesia Stop: 1623    Procedure: ANKLE OPEN REDUCTION INTERNAL FIXATION (Left Ankle) Diagnosis:       Trimalleolar fracture of ankle, closed, left, initial encounter      (Trimalleolar fracture of ankle, closed, left, initial encounter [S82.852A])    Surgeons: Jan Hawkins MD Provider: Ian Blake MD    Anesthesia Type: general with block ASA Status: 2          Anesthesia Type: general with block    Vitals  Vitals Value Taken Time   /68 04/08/22 1702   Temp 97 °F (36.1 °C) 04/08/22 1700   Pulse 87 04/08/22 1706   Resp 16 04/08/22 1700   SpO2 94 % 04/08/22 1706   Vitals shown include unvalidated device data.        Post Anesthesia Care and Evaluation    Patient location during evaluation: PACU  Patient participation: complete - patient participated  Level of consciousness: awake  Pain scale: See nurse's notes for pain score.  Pain management: adequate  Airway patency: patent  Anesthetic complications: No anesthetic complications  PONV Status: none  Cardiovascular status: acceptable  Respiratory status: acceptable  Hydration status: acceptable    Comments: Patient seen and examined postoperatively; vital signs stable; SpO2 greater than or equal to 90%; cardiopulmonary status stable; nausea/vomiting adequately controlled; pain adequately controlled; no apparent anesthesia complications; patient discharged from anesthesia care when discharge criteria were met

## 2022-04-08 NOTE — SIGNIFICANT NOTE
04/08/22 1441   OTHER   Discipline physical therapist   Rehab Time/Intention   Session Not Performed patient unavailable for evaluation;other (see comments)  (surgery for ORIF right ankle at 1415 on 4/8/22)   Recommendation   PT - Next Appointment 04/09/22

## 2022-04-08 NOTE — ANESTHESIA PREPROCEDURE EVALUATION
Anesthesia Evaluation     Patient summary reviewed   NPO Solid Status: > 8 hours  NPO Liquid Status: > 8 hours           Airway   Mallampati: II  TM distance: >3 FB  Neck ROM: full  No difficulty expected  Dental - normal exam     Pulmonary - normal exam   Cardiovascular - normal exam    (+) hypertension,       Neuro/Psych  GI/Hepatic/Renal/Endo    (+)   thyroid problem     Musculoskeletal     Abdominal  - normal exam    Bowel sounds: normal.   Substance History      OB/GYN          Other                        Anesthesia Plan    ASA 2     general with block     intravenous induction     Anesthetic plan, all risks, benefits, and alternatives have been provided, discussed and informed consent has been obtained with: patient.        CODE STATUS:    Code Status (Patient has no pulse and is not breathing): CPR (Attempt to Resuscitate)  Medical Interventions (Patient has pulse or is breathing): Full Support

## 2022-04-08 NOTE — ANESTHESIA PROCEDURE NOTES
Airway  Urgency: elective    Date/Time: 4/8/2022 3:05 PM  Airway not difficult    General Information and Staff    Patient location during procedure: OR  Anesthesiologist: Ian Blake MD  CRNA: Linda Sweeney CAA    Indications and Patient Condition  Indications for airway management: airway protection    Preoxygenated: yes  MILS maintained throughout  Mask difficulty assessment: 0 - not attempted    Final Airway Details  Final airway type: supraglottic airway      Successful airway: unique  Size 4    Number of attempts at approach: 1  Assessment: lips, teeth, and gum same as pre-op and atraumatic intubation    Additional Comments  Placed by Reji DIAZ

## 2022-04-08 NOTE — PROGRESS NOTES
[facility] Daily Progress Note    Patient Name: Chante Wilson  : 1965  MRN: [id number]  Primary Care Physician:  Alex Grullon DO  Date of admission: 2022      Subjective      Chief Complaint: Left ankle pain.      Patient Reports no new symptoms.      Review of Systems   Constitutional: Negative.   HENT: Negative.    Eyes: Negative.    Cardiovascular: Negative.    Respiratory: Negative.    Endocrine: Negative.    Hematologic/Lymphatic: Negative.    Skin: Negative.    Musculoskeletal: Negative.    Gastrointestinal: Negative.    Genitourinary: Negative.    Neurological: Negative.    Psychiatric/Behavioral: Negative.    Allergic/Immunologic: Negative.             Objective      Vitals:   Temp:  [97 °F (36.1 °C)-99.3 °F (37.4 °C)] 97 °F (36.1 °C)  Heart Rate:  [70-96] 86  Resp:  [14-21] 16  BP: ()/(36-86) 104/68  Flow (L/min):  [2] 2    Physical Exam  Vitals and nursing note reviewed.   Constitutional:       General: She is not in acute distress.  HENT:      Head: Normocephalic and atraumatic.      Nose: Nose normal. No congestion or rhinorrhea.      Mouth/Throat:      Mouth: Mucous membranes are moist.      Pharynx: Oropharynx is clear. No oropharyngeal exudate or posterior oropharyngeal erythema.   Eyes:      Pupils: Pupils are equal, round, and reactive to light.   Cardiovascular:      Pulses: Normal pulses.      Heart sounds: Normal heart sounds. No murmur heard.    No friction rub. No gallop.      Comments: S1 and S2 present.  No tachycardia.  Pulmonary:      Effort: No respiratory distress.      Breath sounds: No wheezing, rhonchi or rales.   Chest:      Chest wall: No tenderness.   Abdominal:      General: Abdomen is flat. Bowel sounds are normal. There is no distension.      Palpations: Abdomen is soft.      Tenderness: There is no right CVA tenderness.   Musculoskeletal:         General: Tenderness present. No swelling, deformity or signs of injury.       Cervical back: Neck supple. No tenderness.      Right lower leg: No edema.      Left lower leg: No edema.      Comments: Positive left ankle tenderness.   Skin:     Capillary Refill: Capillary refill takes less than 2 seconds.      Coloration: Skin is not jaundiced.      Findings: No bruising, lesion or rash.   Neurological:      Mental Status: She is alert.      Comments: No facial asymmetry noted.  Gait and station not tested.   Psychiatric:      Comments: No agitation.               Result Review    Result Review:  I have personally reviewed the results from the time of this admission to 4/8/2022 17:23 EDT and agree with these findings:  [x]  Laboratory  [x]  Microbiology  [x]  Radiology  []  EKG/Telemetry   []  Cardiology/Vascular   []  Pathology  []  Old records  []  Other:  Most notable findings include:     Complications:no    CT Lower Extremity Left Without Contrast [[id number]] Srinath as Reviewed   Order Status: Completed Collected: 04/06/22 1300    Updated: 04/06/22 1326   Narrative:     CT LOWER EXTREMITY LEFT WO CONTRAST     Date of Exam: 4/6/2022 11:39 EDT     Indication: Fracture, ankle.     Comparison Exams: Ankle radiograph 4/6/2022     Technique: Contiguous axial images obtained through the ankle. Coronal and sagittal reconstructions were performed. Automated exposure control and iterative reconstruction methods were used.     FINDINGS:   There is improved positioning of the ankle there is now anatomic alignment of the tibiotalar joint. There is a comminuted intra-articular fracture of the posterior malleolus. The fracture fragments measure about 1.8 x 1.1 cm on sagittal view. There are   superiorly displaced by 4 mm. There is a small, 2 mm osseous body in the tibiotalar joint space centrally. There is a displaced fracture of the medial malleolus. The medial mortise is widened and disrupted. The fracture fragment is displaced lateral to   the tibia by about 6 mm. There is an oblique fracture the  distal fibula. It is obliquely oriented and slightly comminuted along its superior extent. It enters ankle mortise. The distal fracture fragment is displaced posterior to the proximal fracture   fragment by 3 mm. There is a nondisplaced oblique fracture of the posterior medial malleolus which is congruent with a comminuted displaced posterior malleolar fracture.     There is soft tissue edema. There is suspected disruption of the syndesmotic ligaments. The soft tissues would be better evaluated with MRI examination. The flexor, extensor, peroneal and Achilles tendons are intact. There is a small os trigonum.         Impression:     There is a trimalleolar fracture with improved alignment compared to the acute film. The ankle mortise is disrupted and widened medially. There is a tiny 3 mm intra-articular fracture fragment at the tibiotalar joint.     Electronically Signed: Charo Jiang MD 4/6/2022 13:24 EDT             Wounds (last 24 hours)     LDA Wound     Row Name 04/08/22 1705 04/08/22 1650 04/08/22 1635       Wound 04/08/22 1515 Left anterior ankle    Wound - Properties Group Placement Date: 04/08/22  - Placement Time: 1515  - Side: Left  - Orientation: anterior  -MC, x2 sites  Location: ankle  -MC    Dressing Appearance dry;intact;no drainage  -SD dry;intact;no drainage  -SD dry;intact;no drainage  -SD    Closure ESTEPHANIE  -SD ESTEPHANIE  -SD ESTEPHANIE  -SD    Retired Wound - Properties Group Placement Date: 04/08/22  - Placement Time: 1515  - Side: Left  - Orientation: anterior  -MC, x2 sites  Location: ankle  -    Retired Wound - Properties Group Date first assessed: 04/08/22  - Time first assessed: 1515  - Side: Left  - Location: ankle  -MC    Row Name 04/08/22 1620 04/08/22 1612 04/08/22 1605       Wound 04/08/22 1515 Left anterior ankle    Wound - Properties Group Placement Date: 04/08/22  - Placement Time: 1515  - Side: Left  - Orientation: anterior  -MC, x2 sites  Location: ankle  -MC    Dressing  Appearance dry;intact;no drainage  -SD -- --    Closure ESTEPHANIE  -SD -- Sutures  xeroform, 4x4s, kerlix, splint, ace wrap  -    Dressing Care -- dressing applied  baci, xeroform, 4x4s, fluffs, webril, plaster splint  - --    Retired Wound - Properties Group Placement Date: 04/08/22  - Placement Time: 1515  -MC Side: Left  - Orientation: anterior  -MC, x2 sites  Location: ankle  -    Retired Wound - Properties Group Date first assessed: 04/08/22  - Time first assessed: 1515  - Side: Left  - Location: ankle  -          User Key  (r) = Recorded By, (t) = Taken By, (c) = Cosigned By    Initials Name Provider Type    Nilam Stout, RN Registered Nurse    Dameon Briones RN Registered Nurse     Ashley Whitman RN Registered Nurse             Radiology Results (last 21 days)    Procedure Component Value Units Date/Time   CT Lower Extremity Left Without Contrast [[id number]] Srinath as Reviewed   Order Status: Completed Collected: 04/06/22 1300    Updated: 04/06/22 1326   Narrative:     CT LOWER EXTREMITY LEFT WO CONTRAST     Date of Exam: 4/6/2022 11:39 EDT     Indication: Fracture, ankle.     Comparison Exams: Ankle radiograph 4/6/2022     Technique: Contiguous axial images obtained through the ankle. Coronal and sagittal reconstructions were performed. Automated exposure control and iterative reconstruction methods were used.     FINDINGS:   There is improved positioning of the ankle there is now anatomic alignment of the tibiotalar joint. There is a comminuted intra-articular fracture of the posterior malleolus. The fracture fragments measure about 1.8 x 1.1 cm on sagittal view. There are   superiorly displaced by 4 mm. There is a small, 2 mm osseous body in the tibiotalar joint space centrally. There is a displaced fracture of the medial malleolus. The medial mortise is widened and disrupted. The fracture fragment is displaced lateral to   the tibia by about 6 mm. There is an oblique fracture the  distal fibula. It is obliquely oriented and slightly comminuted along its superior extent. It enters ankle mortise. The distal fracture fragment is displaced posterior to the proximal fracture   fragment by 3 mm. There is a nondisplaced oblique fracture of the posterior medial malleolus which is congruent with a comminuted displaced posterior malleolar fracture.     There is soft tissue edema. There is suspected disruption of the syndesmotic ligaments. The soft tissues would be better evaluated with MRI examination. The flexor, extensor, peroneal and Achilles tendons are intact. There is a small os trigonum.         Impression:     There is a trimalleolar fracture with improved alignment compared to the acute film. The ankle mortise is disrupted and widened medially. There is a tiny 3 mm intra-articular fracture fragment at the tibiotalar joint.     Electronically Signed: Charo Jiang MD 4/6/2022 13:24 EDT           Assessment/Plan    From previous note and with minor updates.  Brief Patient Summary:  Patient is a 56 y.o. female with a history of obesity, tobacco use disorder, psoriasis, hypertension and hypothyroidism who presented to the ER at [facility] on 4/6/2022 complaining of left ankle pain. The patient states she missed a step and fell this morning. She states she cannot bear weight on her left side. She states the pain is severe and worse with movement. She denies any alleviating factors. She denies any other complaints.   The patient states she smokes a couple cigarettes daily. She reports occasional alcohol intake. She denies any illicit drug use. She reports a family history of cancer.  Patient was seen in the emergency room and workup in the ER revealed left trimalleolar fracture. The patient was given Dilaudid for pain relief. She was given conscious sedation and her fracture was reduced by the ER MD. A splint cast was applied. Orthopedic surgery was consulted. The patient was also noted to  have hypokalemia so she was started on replacement. She was admitted to the Hospitalist group for further evaluation.   Orthopedic surgery consult was completed.        [MAR Hold] senna-docusate sodium, 2 tablet, Oral, BID  [MAR Hold] sodium chloride, 10 mL, Intravenous, Q12H       lactated ringers, 125 mL/hr, Last Rate: 125 mL/hr (04/08/22 1237)  phenylephrine, 0.5-3 mcg/kg/min         Active Hospital Problems:  Active Hospital Problems    Diagnosis    • **Trimalleolar fracture of ankle, closed, left, initial encounter  Follow orthopedic surgery recommendations.      • Acute left ankle pain  Treat with pain control.      • Acute hypokalemia  Replete.      • Physical debility  Treat with PT OT after surgery.      • Fall    • Primary hypertension  Stable.      • Hypothyroidism (acquired)  Treat with Synthroid.      • Tobacco use disorder  Treat with nicotine patch.  Complete tobacco cessation counseling.    Obesity.  Encourage lifestyle modifications.          Continue appropriate patient's home medications for other chronic medical conditions.  Continue the present level of care.  Patient and family agreed with the plan of care.          HPI, physical examination, assessment and plan were reviewed and no changes were made unless otherwise noted.      DVT prophylaxis:  Mechanical DVT prophylaxis orders are present.    CODE STATUS:    Code Status (Patient has no pulse and is not breathing): CPR (Attempt to Resuscitate)  Medical Interventions (Patient has pulse or is breathing): Full Support      Disposition: Pending patient's clinical improvement.      This patient has been examined wearing appropriate Personal Protective Equipment and discussed with hospital infection control department, [facility], infectious disease specialist and pulmonologist. 04/08/22      Electronically signed by Charles Orta MD, FACP, 04/08/22, 17:23 EDT.      [facility] Hospitalist Team

## 2022-04-08 NOTE — PLAN OF CARE
Goal Outcome Evaluation:  Plan of Care Reviewed With: patient, spouse        Progress: no change      Patient has complaints of pain and was treated with PRN pain medications. Patient has periwick in place and is tolerating it. VSS. Will continue to monitor.

## 2022-04-08 NOTE — CASE MANAGEMENT/SOCIAL WORK
Continued Stay Note  KASHIF Merchant     Patient Name: Chante Wilson  MRN: [id number]  Today's Date: 4/8/2022    Admit Date: 4/6/2022     Discharge Plan     Row Name 04/08/22 1254       Plan    Plan Surgery 4/8/2022. Pending progress with PT after surgery              Met with patient in room wearing PPE: mask, face shield/goggles, gloves, gown.      Maintained distance greater than six feet and spent less than 15 minutes in the room.          Cleo Helton RN

## 2022-04-08 NOTE — OP NOTE
ANKLE OPEN REDUCTION INTERNAL FIXATION  Procedure Report    Patient Name:  Chante Wilson  YOB: 1965    Date of Surgery:  4/8/2022     Indications: This is a 56 y.o. female with an injury to the left ankle.  Imaging demonstrated displaced trimalleolar goal fracture.Treatment options were discussed.  They desired to proceed with open reduction internal fixation after discussing the risks including bleeding, scarring,infection, stiffness, nerve damage, tendon damage, artery damage, continued pain, DVT, malunion, nonunion, loss of life or limb, and a need for further surgery including hardware removal.      Pre-op Diagnosis:   Trimalleolar fracture of ankle, closed, left, initial encounter [S82.852A]         Post-op Diagnosis:    Post-Op Diagnosis Codes:     * Trimalleolar fracture of ankle, closed, left, initial encounter [S82.852A]    Procedure/CPT® Codes:  68914    Procedure(s):  ANKLE OPEN REDUCTION INTERNAL FIXATION left trimalleolar ankle fracture with fixation of medial lateral malleolus             Anesthesia: General with Block    IVF: See anesthesia record    Estimated Blood Loss: 10 mL    Implants:    Implant Name Type Inv. Item Serial No.  Lot No. LRB No. Used Action   SUT NONABS MAXBRAID/PE NMBR2 C7 38IN ERIC [id number] - [id number] Implant SUT NONABS MAXBRAID/PE NMBR2 C7 38IN ERIC [id number]  [employer] [id number] Left 1 Implanted   PLT FIB PERIART LK D/L 6H 106 LT - [id number] Implant PLT FIB PERIART LK D/L 6H 106 LT  [employer] . Left 1 Implanted   SCRW ULS LK 2.7X18MM - [id number] Implant SCRW ULS LK 2.7X18MM  [employer] . Left 1 Implanted   SCRW ULS LK 2.7X20MM - [id number] Implant SCRW ULS LK 2.7X20MM  [employer] . Left 1 Implanted   SCRW ULS LK PT S/TAP 2.7X14MM - [id number] Implant SCRW ULS LK PT S/TAP 2.7X14MM  [employer] . Left 1 Implanted   SCRW LUCINDA SD/ST PT 3.5X50MM - [id number] Implant SCRW LUCINDA SD/ST PT 3.5X50MM  [employer] . Left 1 Implanted   SCRW LUCINDA  SD/ST PT 3.5X40MM - [id number] Implant SCRW LUCINDA SD/ST PT 3.5X40MM  [employer] . Left 1 Implanted   SCRW PERIART S/TAP HD/2.7MM 3.5X16MM - [id number] Implant SCRW PERIART S/TAP HD/2.7MM 3.5X16MM  [employer] . Left 1 Implanted   SCRW PERIART S/TAP HD/2.7MM 3.5X14MM - [id number] Implant SCRW PERIART S/TAP HD/2.7MM 3.5X14MM  [employer] . Left 1 Implanted   SCRW POLINA PERIART LP S/TAP HD2.7  3.5X20 - [id number] Implant SCRW POLINA PERIART LP S/TAP HD2.7  3.5X20  [employer] . Left 1 Implanted         Complications: None    Tourniquet: 58 minutes    Specimens:none    Description of Procedure: The patient's operative site was marked.  Regional  anesthesia was administered.  Patient was brought to the operating room and placed on the operating room table.  General anesthesia was administered. Antibiotics were dosed.  A timeout was taken to confirm the correct operative site and procedure.    The ankle was then prepped and draped in a standard surgical fashion and a tourniquet placed on the upper leg.  Leg was exsanguinated and tourniquet inflated.    Incision was marked and opened over the lateral malleolus.  Sharp dissection distally with blunt dissection proximally identified the fracture.  It was cleaned of hematoma and clot and reduced with a clamp.  Lag screw was placed and the plate was positioned over the fibula.  Screws were placed proximally and distally.        Medial incision was then opened blunt dissection identified the fracture cleaned of hematoma and soft tissue and pinned and to partially-threaded cannulated screws placed    Fluoroscopy confirmed reduction of the fracture and mortise with hardware in position.  Wound was irrigated and closed in layers with suture and staples.   A sterile dressing was applied.  Tourniquet released and a short leg splint placed.  Patient was awakened and taken to the recovery room.  There were no complications.  I was present for all portions.  Counts were correct.   Good capillary refill was noted of the digits.        Jan Hawkins MD     Date: 4/8/2022  Time: 16:19 EDT

## 2022-04-08 NOTE — PLAN OF CARE
Goal Outcome Evaluation:  Plan of Care Reviewed With: patient   Pt returned to floor from OR, alert and oriented x 4, numbness present in LLE, no complaints of pain. VSS, post op care instructions given, no concerns at this time.

## 2022-04-09 LAB
MAGNESIUM SERPL-MCNC: 1.9 MG/DL (ref 1.6–2.6)
POTASSIUM SERPL-SCNC: 4.6 MMOL/L (ref 3.5–5.2)

## 2022-04-09 PROCEDURE — 99226 PR SBSQ OBSERVATION CARE/DAY 35 MINUTES: CPT | Performed by: INTERNAL MEDICINE

## 2022-04-09 PROCEDURE — 97162 PT EVAL MOD COMPLEX 30 MIN: CPT

## 2022-04-09 PROCEDURE — G0378 HOSPITAL OBSERVATION PER HR: HCPCS

## 2022-04-09 PROCEDURE — 83735 ASSAY OF MAGNESIUM: CPT | Performed by: ORTHOPAEDIC SURGERY

## 2022-04-09 PROCEDURE — 25010000002 ENOXAPARIN PER 10 MG: Performed by: ORTHOPAEDIC SURGERY

## 2022-04-09 PROCEDURE — 25010000002 CEFAZOLIN PER 500 MG: Performed by: ORTHOPAEDIC SURGERY

## 2022-04-09 PROCEDURE — 84132 ASSAY OF SERUM POTASSIUM: CPT | Performed by: ORTHOPAEDIC SURGERY

## 2022-04-09 RX ORDER — OXYCODONE HYDROCHLORIDE AND ACETAMINOPHEN 5; 325 MG/1; MG/1
1 TABLET ORAL EVERY 6 HOURS PRN
Qty: 28 TABLET | Refills: 0 | Status: SHIPPED | OUTPATIENT
Start: 2022-04-09 | End: 2022-05-23

## 2022-04-09 RX ORDER — PROMETHAZINE HYDROCHLORIDE 12.5 MG/1
12.5 TABLET ORAL EVERY 6 HOURS PRN
Qty: 21 TABLET | Refills: 1 | Status: SHIPPED | OUTPATIENT
Start: 2022-04-09

## 2022-04-09 RX ORDER — CEPHALEXIN 500 MG/1
500 CAPSULE ORAL 4 TIMES DAILY
Qty: 11 CAPSULE | Refills: 0 | Status: SHIPPED | OUTPATIENT
Start: 2022-04-09 | End: 2022-04-13

## 2022-04-09 RX ORDER — CEPHALEXIN 500 MG/1
500 CAPSULE ORAL EVERY 8 HOURS SCHEDULED
Status: DISCONTINUED | OUTPATIENT
Start: 2022-04-09 | End: 2022-04-10 | Stop reason: HOSPADM

## 2022-04-09 RX ORDER — LEVOTHYROXINE SODIUM 0.03 MG/1
25 TABLET ORAL
Status: DISCONTINUED | OUTPATIENT
Start: 2022-04-09 | End: 2022-04-10 | Stop reason: HOSPADM

## 2022-04-09 RX ADMIN — ENOXAPARIN SODIUM 40 MG: 40 INJECTION SUBCUTANEOUS at 15:08

## 2022-04-09 RX ADMIN — CEPHALEXIN 500 MG: 500 CAPSULE ORAL at 20:02

## 2022-04-09 RX ADMIN — CEPHALEXIN 500 MG: 500 CAPSULE ORAL at 15:08

## 2022-04-09 RX ADMIN — OXYCODONE HYDROCHLORIDE 20 MG: 20 TABLET, FILM COATED, EXTENDED RELEASE ORAL at 10:13

## 2022-04-09 RX ADMIN — WATER 2 G: 1000 INJECTION, SOLUTION INTRAVENOUS at 06:39

## 2022-04-09 RX ADMIN — Medication 10 ML: at 20:03

## 2022-04-09 RX ADMIN — SODIUM CHLORIDE 75 ML/HR: 9 INJECTION, SOLUTION INTRAVENOUS at 06:39

## 2022-04-09 RX ADMIN — SENNOSIDES AND DOCUSATE SODIUM 2 TABLET: 50; 8.6 TABLET ORAL at 10:13

## 2022-04-09 RX ADMIN — OXYCODONE HYDROCHLORIDE 20 MG: 20 TABLET, FILM COATED, EXTENDED RELEASE ORAL at 20:03

## 2022-04-09 RX ADMIN — LEVOTHYROXINE SODIUM 25 MCG: 0.03 TABLET ORAL at 08:05

## 2022-04-09 RX ADMIN — Medication 3 ML: at 20:03

## 2022-04-09 RX ADMIN — Medication 10 ML: at 10:13

## 2022-04-09 NOTE — CASE MANAGEMENT/SOCIAL WORK
Continued Stay Note  KASHIF Mayur     Patient Name: Chante Wilson  MRN: [id number]  Today's Date: 4/9/2022    Admit Date: 4/6/2022     Discharge Plan     Row Name 04/09/22 1241       Plan    Plan home with [facility]- pending acceptance    Provided Post Acute Provider List? Yes    Post Acute Provider List Home Health    Delivered To Patient    Method of Delivery In person    Plan Comments noted physical therapy recommendation of inpt rehab.  pt told nursing and cm that she would have children and friends able to check in on her or stay for several hours each day and she did not want to go to rehab. pt selected [facility] from home health list; referral sent in epic and left message with answering servie regarding new referral. discussed DME with pt- she stated she had already ordered a scooter per Dr. Hawkins recommendation.  stated a family member had a rolling walker avail and she did not want one.  spouse in agreement with pt's plan to go home with home health.                    Expected Discharge Date and Time     Expected Discharge Date Expected Discharge Time    Apr 9, 2022         Met with patient in room wearing PPE: mask.      Maintained distance greater than six feet and spent less than 15 minutes in the room.          Livia Nuñez RN

## 2022-04-09 NOTE — PROGRESS NOTES
"     Patient ID: Chante Wilson is a 56 y.o. female.  Pain controlled still quite numb      Objective:    BP 90/53 (BP Location: Left arm, Patient Position: Lying)   Pulse 81   Temp 97.5 °F (36.4 °C) (Oral)   Resp 17   Ht 167.6 cm (66\")   Wt 95.5 kg (210 lb 8.6 oz)   SpO2 94%   BMI 33.98 kg/m²     Physical Examination:  Foot is warm and perfused with still numbness from the block.  No pain on passive stretch of the toes.       Imaging:      Assessment:  Doing well after ORIF left ankle    Plan:  Begin physical therapy nonweightbearing.  Gave them instructions for obtaining a knee scooter.  Can be discharged when comfortable if stays overnight transition to oral antibiotics begin DVT prophylaxis when discharge see me in the office in 2 weeks.  If going to rehab can be discharged anytime    Procedures  "

## 2022-04-09 NOTE — PLAN OF CARE
Goal Outcome Evaluation:    Outcome Evaluation: Pt is a 55 y/o female who presents to [facility] s/p L ankle ORIF d/t trimalleolar fx on 4/8/22 after falling down her stairs. PMH includes but is not limited to tobacco use disorder, psoriasis, hypertension and hypothyroidism. At [facility] pt lives in a two story home with her spouse and has 1 step to enter her home. She would be able to stay on the first floor if needed. At this time pt demonstrates increased anxiety with ability to navigate her home without assist. She requires v/c for increased safety awareness, but is able to maintain LLE NWB during STS transfers and with ambulation x15ft using RW w/ CGA. She reports she does not have 24/7 assist and will only have someone at night for assist. Recommend ACUTE inpatient rehab at d/c as she demonstrates decreased safety awareness and impaired balance to return home alone at this time.

## 2022-04-09 NOTE — PLAN OF CARE
Goal Outcome Evaluation:         Pt doing well today. She states she is still numb and not able to wiggle her toes yet. Cap refill adequate. She is compliant with NWB to LLE. Spouse at bedside and is very helpful with transfers and mobility. No c/o pain at this time. BP monitored. Oral ATB started and given.

## 2022-04-09 NOTE — THERAPY EVALUATION
Patient Name: Chante Wilson  : 1965    MRN: [id number]                              Today's Date: 2022       Admit Date: 2022    Visit Dx:     ICD-10-CM ICD-9-CM   1. Trimalleolar fracture of ankle, closed, left, initial encounter  S82.852A 824.6   2. Hypokalemia  E87.6 276.8     Patient Active Problem List   Diagnosis   • Trimalleolar fracture of ankle, closed, left, initial encounter   • Fall   • Acute left ankle pain   • Acute hypokalemia   • Primary hypertension   • Hypothyroidism (acquired)   • Tobacco use disorder   • Physical debility     Past Medical History:   Diagnosis Date   • Essential hypertension    • Hypothyroidism (acquired)    • Psoriasis    • Tobacco abuse      History reviewed. No pertinent surgical history.   General Information     Row Name 22 1002          Physical Therapy Time and Intention    Document Type evaluation  -     Mode of Treatment physical therapy  -     Row Name 22 1002          General Information    Prior Level of Function independent:;community mobility;transfer;bed mobility;ADL's;driving  -     Existing Precautions/Restrictions weight bearing  LLE NWB  -     Row Name 22 1002          Living Environment    People in Home spouse  -     Row Name 22 1002          Home Main Entrance    Number of Stairs, Main Entrance one  -     Row Name 22 1002          Stairs Within Home, Primary    Number of Stairs, Within Home, Primary twelve  Bedroom on second floor, but pt is able to stay on the couch on the first floor.  -     Row Name 22 1002          Cognition    Orientation Status (Cognition) oriented x 4  -     Row Name 22 1002          Safety Issues, Functional Mobility    Safety Issues Affecting Function (Mobility) safety precaution awareness  -     Impairments Affecting Function (Mobility) balance;endurance/activity tolerance;strength  -           User Key  (r) = Recorded By, (t) = Taken By, (c) = Cosigned By     Initials Name Provider Type    MARCO A Suzette Trinh, PT Physical Therapist               Mobility     Row Name 04/09/22 1007          Bed Mobility    Bed Mobility supine-sit  -     Supine-Sit [place] (Bed Mobility) modified independence  -     Assistive Device (Bed Mobility) head of bed elevated;bed rails  -     Row Name 04/09/22 1007          Sit-Stand Transfer    Sit-Stand [place] (Transfers) contact guard;verbal cues  -     Assistive Device (Sit-Stand Transfers) walker, front-wheeled  -     Row Name 04/09/22 1007          Gait/Stairs (Locomotion)    [place] Level (Gait) contact guard  -     Assistive Device (Gait) walker, front-wheeled  -     Distance in Feet (Gait) 15ft  -     Deviations/Abnormal Patterns (Gait) bilateral deviations;gait speed decreased  -     Bilateral Gait Deviations heel strike decreased  -     Comment, (Gait/Stairs) Pt demonstrates no LOB or instability during ambulation w/ good use of BUE to maintain NWB.  -     Row Name 04/09/22 1007          Mobility    Extremity Weight-bearing Status left lower extremity  -     Left Lower Extremity (Weight-bearing Status) non weight-bearing (NWB)  -           User Key  (r) = Recorded By, (t) = Taken By, (c) = Cosigned By    Initials Name Provider Type    MARCO A Suzette Trinh, PT Physical Therapist               Obj/Interventions     Row Name 04/09/22 1009          Range of Motion Comprehensive    General Range of Motion bilateral upper extremity ROM WFL;bilateral lower extremity ROM WFL  -[facility] Name 04/09/22 1009          Strength Comprehensive (MMT)    Comment, General Manual Muscle Testing (MMT) Assessment BUE 4+/5, BLE grossly 4+/5 (ESTEPHANIE L ankle)  -     Row Name 04/09/22 1009          Balance    Balance Assessment sitting static balance;standing static balance;standing dynamic balance;sitting dynamic balance  -     Static Sitting Balance supervision  -     Dynamic Sitting Balance standby assist  -      Position, Sitting Balance unsupported;sitting edge of bed  -MARCO A     Static Standing Balance standby assist  -MARCO A     Dynamic Standing Balance contact guard  Unable to reach outside of ISSAC to L w/out assist  -MARCO A     Position/Device Used, Standing Balance supported;walker, rolling  -     Row Name 04/09/22 1009          Sensory Assessment (Somatosensory)    Sensory Assessment (Somatosensory) other (see comments)  Pt reports numbness to LLE below her knee; All others intact  -MARCO A           User Key  (r) = Recorded By, (t) = Taken By, (c) = Cosigned By    Initials Name Provider Type    Suzette Colbert, PT Physical Therapist               Goals/Plan     Row Name 04/09/22 1021          Bed Mobility Goal 1 (PT)    Activity/Assistive Device (Bed Mobility Goal 1, PT) bed mobility activities, all  -MARCO A     [place] Level/Cues Needed (Bed Mobility Goal 1, PT) independent  -MARCO A     Time Frame (Bed Mobility Goal 1, PT) long term goal (LTG);2 weeks  -     Row Name 04/09/22 1021          Transfer Goal 1 (PT)    Activity/Assistive Device (Transfer Goal 1, PT) transfers, all  -MARCO A     [place] Level/Cues Needed (Transfer Goal 1, PT) independent  -MARCO A     Time Frame (Transfer Goal 1, PT) long term goal (LTG);2 weeks  -     Row Name 04/09/22 1021          Gait Training Goal 1 (PT)    Activity/Assistive Device (Gait Training Goal 1, PT) gait (walking locomotion)  -MARCO A     [place] Level (Gait Training Goal 1, PT) independent  -MARCO A     Distance (Gait Training Goal 1, PT) 50ft  -MARCO A     Time Frame (Gait Training Goal 1, PT) long term goal (LTG);2 weeks  -     Row Name 04/09/22 1021          Stairs Goal 1 (PT)    Activity/Assistive Device (Stairs Goal 1, PT) stairs, all skills  -MARCO A     Number of Stairs (Stairs Goal 1, PT) 1 step  -MARCO A     Time Frame (Stairs Goal 1, PT) long term goal (LTG);2 weeks  -           User Key  (r) = Recorded By, (t) = Taken By, (c) = Cosigned By    Initials Name Provider Type    MARCO A Trinh  Suzette, ARISTEO Physical Therapist               Clinical Impression     Row Name 04/09/22 1012          Pain    Pretreatment Pain Rating 0/10 - no pain  -MARCO A     Posttreatment Pain Rating 0/10 - no pain  -MARCO A     Row Name 04/09/22 1012          Plan of Care Review    Plan of Care Reviewed With patient;spouse  -     Outcome Evaluation Pt is a 57 y/o female who presents to [facility] s/p L ankle ORIF d/t trimalleolar fx on 4/8/22 after falling down her stairs. PMH includes but is not limited to tobacco use disorder, psoriasis, hypertension and hypothyroidism. At [facility] pt lives in a two story home with her spouse and has 1 step to enter her home. She would be able to stay on the first floor if needed. At this time pt demonstrates increased anxiety with ability to navigate her home without assist. She requires v/c for increased safety awareness, but is able to maintain LLE NWB during STS transfers and with ambulation x15ft using RW w/ CGA. She reports she does not have 24/7 assist and will only have someone at night for assist. Recommend ACUTE inpatient rehab at d/c as she demonstrates decreased safety awareness and impaired balance to return home alone at this time.  -     Row Name 04/09/22 1012          Therapy Assessment/Plan (PT)    Predicted Duration of Therapy Intervention (PT) Until d/c  -MARCO A     Row Name 04/09/22 1012          Vital Signs    O2 Delivery Pre Treatment room air  -     O2 Delivery Intra Treatment room air  -     O2 Delivery Post Treatment room air  -     Row Name 04/09/22 1012          Positioning and Restraints    Pre-Treatment Position in bed  -MARCO A     Post Treatment Position chair  -MARCO A     In Chair reclined;call light within reach;encouraged to call for assist;exit alarm on;notified nsg;with family/caregiver  -MARCO A           User Key  (r) = Recorded By, (t) = Taken By, (c) = Cosigned By    Initials Name Provider Type    Suzette Colbert, PT Physical Therapist               Outcome Measures     Annabelle  Name 04/09/22 1021          How much help from another person do you currently need...    Turning from your back to your side while in flat bed without using bedrails? 3  -MARCO A     Moving from lying on back to sitting on the side of a flat bed without bedrails? 3  -MARCO A     Moving to and from a bed to a chair (including a wheelchair)? 3  -MARCO A     Standing up from a chair using your arms (e.g., wheelchair, bedside chair)? 3  -MARCO A     Climbing 3-5 steps with a railing? 2  -MARCO A     To walk in hospital room? 2  -MARCO A     AM-PAC 6 Clicks Score (PT) 16  -MARCO A     Row Name 04/09/22 1021          Functional Assessment    Outcome Measure Options AM-PAC 6 Clicks Basic Mobility (PT)  -           User Key  (r) = Recorded By, (t) = Taken By, (c) = Cosigned By    Initials Name Provider Type    Suzette Colbert, PT Physical Therapist                             Physical Therapy Education                 Title: PT OT SLP Therapies (Done)     Topic: Physical Therapy (Done)     Point: Mobility training (Done)     Learning Progress Summary           Patient Acceptance, E,TB, VU by  at 4/9/2022 1022                   Point: Home exercise program (Done)     Learning Progress Summary           Patient Acceptance, E,TB, VU by  at 4/9/2022 1022                   Point: Body mechanics (Done)     Learning Progress Summary           Patient Acceptance, E,TB, VU by  at 4/9/2022 1022                   Point: Precautions (Done)     Learning Progress Summary           Patient Acceptance, E,TB, VU by  at 4/9/2022 1022                               User Key     Initials Effective Dates Name Provider Type [facility] 08/23/21 -  Suzette Trinh, ARISTEO Physical Therapist PT              PT Recommendation and Plan  Planned Therapy Interventions (PT): bed mobility training, balance training, gait training, home exercise program, motor coordination training, neuromuscular re-education, patient/family education, stair training, strengthening, transfer  training  Plan of Care Reviewed With: patient, spouse  Outcome Evaluation: Pt is a 57 y/o female who presents to [facility] s/p L ankle ORIF d/t trimalleolar fx on 4/8/22 after falling down her stairs. PMH includes but is not limited to tobacco use disorder, psoriasis, hypertension and hypothyroidism. At [facility] pt lives in a two story home with her spouse and has 1 step to enter her home. She would be able to stay on the first floor if needed. At this time pt demonstrates increased anxiety with ability to navigate her home without assist. She requires v/c for increased safety awareness, but is able to maintain LLE NWB during STS transfers and with ambulation x15ft using RW w/ CGA. She reports she does not have 24/7 assist and will only have someone at night for assist. Recommend ACUTE inpatient rehab at d/c as she demonstrates decreased safety awareness and impaired balance to return home alone at this time.     Time Calculation:    PT Charges     Row Name 04/09/22 1023             Time Calculation    Start Time 0756  -MARCO A      Stop Time 0818  -MARCO A      Time Calculation (min) 22 min  -MARCO A      PT Received On 04/09/22  -MARCO A      PT - Next Appointment 04/11/22  -MARCO A      PT Goal Re-Cert Due Date 04/23/22  -MARCO A            User Key  (r) = Recorded By, (t) = Taken By, (c) = Cosigned By    Initials Name Provider Type    Suzette Colebrt PT Physical Therapist              Therapy Charges for Today     Code Description Service Date Service Provider Modifiers Qty    [id number] HC PT EVAL MOD COMPLEXITY 4 4/9/2022 Suzette Trinh, PT GP 1          PT G-Codes  Outcome Measure Options: AM-PAC 6 Clicks Basic Mobility (PT)  AM-PAC 6 Clicks Score (PT): 16    Suzette Trinh PT  4/9/2022

## 2022-04-09 NOTE — PROGRESS NOTES
[facility] Services Daily Progress Note    Patient Name: Chante Wilson  : 1965  MRN: [id number]  Primary Care Physician:  Alex Grullon DO  Date of admission: 2022      Subjective      Chief Complaint: Left ankle pain.      Patient Reports left toes numbness.  Patient has good capillary refill.      Review of Systems   Constitutional: Negative.   HENT: Negative.    Eyes: Negative.    Cardiovascular: Negative.    Respiratory: Negative.    Endocrine: Negative.    Hematologic/Lymphatic: Negative.    Skin: Negative.    Musculoskeletal: Negative.    Gastrointestinal: Negative.    Genitourinary: Negative.    Neurological: Negative.    Psychiatric/Behavioral: Negative.    Allergic/Immunologic: Negative.             Objective      Vitals:   Temp:  [97 °F (36.1 °C)-98.9 °F (37.2 °C)] 97.5 °F (36.4 °C)  Heart Rate:  [73-98] 95  Resp:  [15-22] 18  BP: ()/(53-68) 99/56    Physical Exam  Vitals and nursing note reviewed.   Constitutional:       General: She is not in acute distress.     Comments: Patient is lying comfortably in bed and in no acute distress.   is at the bedside.   HENT:      Head: Normocephalic and atraumatic.      Nose: Nose normal. No congestion or rhinorrhea.      Mouth/Throat:      Mouth: Mucous membranes are moist.      Pharynx: Oropharynx is clear. No oropharyngeal exudate or posterior oropharyngeal erythema.   Eyes:      Pupils: Pupils are equal, round, and reactive to light.   Cardiovascular:      Pulses: Normal pulses.      Heart sounds: Normal heart sounds. No murmur heard.    No friction rub. No gallop.      Comments: S1 and S2 present.  No tachycardia.  Pulmonary:      Effort: No respiratory distress.      Breath sounds: No wheezing, rhonchi or rales.   Chest:      Chest wall: No tenderness.   Abdominal:      General: Abdomen is flat. Bowel sounds are normal. There is no distension.      Palpations: Abdomen is soft.      Tenderness: There is no  right CVA tenderness.   Musculoskeletal:         General: No swelling, deformity or signs of injury.      Cervical back: Neck supple. No tenderness.      Right lower leg: No edema.      Left lower leg: No edema.      Comments: Left ankle is wrapped in a bandage.   Skin:     Capillary Refill: Capillary refill takes less than 2 seconds.      Coloration: Skin is not jaundiced.      Findings: No bruising, lesion or rash.   Neurological:      Mental Status: She is alert.      Comments: No facial asymmetry noted.  Gait and station not tested.   Psychiatric:      Comments: No agitation.               Result Review    Result Review:  I have personally reviewed the results from the time of this admission to 4/9/2022 16:45 EDT and agree with these findings:  [x]  Laboratory  [x]  Microbiology  [x]  Radiology  []  EKG/Telemetry   []  Cardiology/Vascular   []  Pathology  []  Old records  []  Other:  Most notable findings include:     Complications:no    CT Lower Extremity Left Without Contrast [[id number]] Srinath as Reviewed   Order Status: Completed Collected: 04/06/22 1300    Updated: 04/06/22 1326   Narrative:     CT LOWER EXTREMITY LEFT WO CONTRAST     Date of Exam: 4/6/2022 11:39 EDT     Indication: Fracture, ankle.     Comparison Exams: Ankle radiograph 4/6/2022     Technique: Contiguous axial images obtained through the ankle. Coronal and sagittal reconstructions were performed. Automated exposure control and iterative reconstruction methods were used.     FINDINGS:   There is improved positioning of the ankle there is now anatomic alignment of the tibiotalar joint. There is a comminuted intra-articular fracture of the posterior malleolus. The fracture fragments measure about 1.8 x 1.1 cm on sagittal view. There are   superiorly displaced by 4 mm. There is a small, 2 mm osseous body in the tibiotalar joint space centrally. There is a displaced fracture of the medial malleolus. The medial mortise is widened and disrupted.  The fracture fragment is displaced lateral to   the tibia by about 6 mm. There is an oblique fracture the distal fibula. It is obliquely oriented and slightly comminuted along its superior extent. It enters ankle mortise. The distal fracture fragment is displaced posterior to the proximal fracture   fragment by 3 mm. There is a nondisplaced oblique fracture of the posterior medial malleolus which is congruent with a comminuted displaced posterior malleolar fracture.     There is soft tissue edema. There is suspected disruption of the syndesmotic ligaments. The soft tissues would be better evaluated with MRI examination. The flexor, extensor, peroneal and Achilles tendons are intact. There is a small os trigonum.         Impression:     There is a trimalleolar fracture with improved alignment compared to the acute film. The ankle mortise is disrupted and widened medially. There is a tiny 3 mm intra-articular fracture fragment at the tibiotalar joint.     Electronically Signed: Charo Jiang MD 4/6/2022 13:24 EDT             Wounds (last 24 hours)     LDA Wound     Row Name 04/09/22 0712 04/09/22 0300 04/08/22 2330       Wound 04/08/22 1515 Left anterior ankle    Wound - Properties Group Placement Date: 04/08/22  - Placement Time: 1515  - Side: Left  - Orientation: anterior  -MC, x2 sites  Location: ankle  -MC    Dressing Appearance -- dry;intact;no drainage  -RD dry;intact;no drainage  -RD    Closure ESTEPHANIE  -SL ESTEPHANIE  -RD ESTEPHANIE  -RD    Dressing Care elastic bandage  -SL -- --    Retired Wound - Properties Group Placement Date: 04/08/22  - Placement Time: 1515  -MC Side: Left  - Orientation: anterior  -MC, x2 sites  Location: ankle  -    Retired Wound - Properties Group Date first assessed: 04/08/22  - Time first assessed: 1515  - Side: Left  - Location: ankle  -MC    Row Name 04/08/22 1944 04/08/22 1728 04/08/22 1705       Wound 04/08/22 1515 Left anterior ankle    Wound - Properties Group Placement Date:  04/08/22  - Placement Time: 1515  -MC Side: Left  -MC Orientation: anterior  -MC, x2 sites  Location: ankle  -MC    Dressing Appearance dry;intact;no drainage  -RD dry;intact;no drainage  -AY dry;intact;no drainage  -SD    Closure ESTEPHANIE  -RD ESTEPHANIE  -AY ESTEPHANIE  -SD    Retired Wound - Properties Group Placement Date: 04/08/22  - Placement Time: 1515  -MC Side: Left  -MC Orientation: anterior  -MC, x2 sites  Location: ankle  -MC    Retired Wound - Properties Group Date first assessed: 04/08/22  - Time first assessed: 1515  -MC Side: Left  -MC Location: ankle  -MC    Row Name 04/08/22 1650             Wound 04/08/22 1515 Left anterior ankle    Wound - Properties Group Placement Date: 04/08/22  - Placement Time: 1515  -MC Side: Left  -MC Orientation: anterior  -MC, x2 sites  Location: ankle  -MC      Dressing Appearance dry;intact;no drainage  -SD      Closure ESTEPHANIE  -SD      Retired Wound - Properties Group Placement Date: 04/08/22  - Placement Time: 1515  -MC Side: Left  -MC Orientation: anterior  -MC, x2 sites  Location: ankle  -MC      Retired Wound - Properties Group Date first assessed: 04/08/22  - Time first assessed: 1515  - Side: Left  -MC Location: ankle  -MC            User Key  (r) = Recorded By, (t) = Taken By, (c) = Cosigned By    Initials Name Provider Type    Nilam Stout RN Registered Nurse    Dameon Briones, RN Registered Nurse    Karin Fitzpatrick RN Registered Nurse    Eugenie Gomez RN Registered Nurse    Carmen Pineda RN Registered Nurse             Radiology Results (last 21 days)    Procedure Component Value Units Date/Time   CT Lower Extremity Left Without Contrast [[id number]] Srinath as Reviewed   Order Status: Completed Collected: 04/06/22 1300    Updated: 04/06/22 1326   Narrative:     CT LOWER EXTREMITY LEFT WO CONTRAST     Date of Exam: 4/6/2022 11:39 EDT     Indication: Fracture, ankle.     Comparison Exams: Ankle radiograph 4/6/2022     Technique: Contiguous axial  images obtained through the ankle. Coronal and sagittal reconstructions were performed. Automated exposure control and iterative reconstruction methods were used.     FINDINGS:   There is improved positioning of the ankle there is now anatomic alignment of the tibiotalar joint. There is a comminuted intra-articular fracture of the posterior malleolus. The fracture fragments measure about 1.8 x 1.1 cm on sagittal view. There are   superiorly displaced by 4 mm. There is a small, 2 mm osseous body in the tibiotalar joint space centrally. There is a displaced fracture of the medial malleolus. The medial mortise is widened and disrupted. The fracture fragment is displaced lateral to   the tibia by about 6 mm. There is an oblique fracture the distal fibula. It is obliquely oriented and slightly comminuted along its superior extent. It enters ankle mortise. The distal fracture fragment is displaced posterior to the proximal fracture   fragment by 3 mm. There is a nondisplaced oblique fracture of the posterior medial malleolus which is congruent with a comminuted displaced posterior malleolar fracture.     There is soft tissue edema. There is suspected disruption of the syndesmotic ligaments. The soft tissues would be better evaluated with MRI examination. The flexor, extensor, peroneal and Achilles tendons are intact. There is a small os trigonum.         Impression:     There is a trimalleolar fracture with improved alignment compared to the acute film. The ankle mortise is disrupted and widened medially. There is a tiny 3 mm intra-articular fracture fragment at the tibiotalar joint.     Electronically Signed: Charo Jiang MD 4/6/2022 13:24 EDT           Assessment/Plan    From previous note and with minor updates.  Brief Patient Summary:  Patient is a 56 y.o. female with a history of hypothyroidism, obesity, tobacco use disorder, psoriasis and hypertension  who presented to the ER at [facility] on 4/6/2022  complaining of left ankle pain. The patient states she missed a step and fell this morning. She states she cannot bear weight on her left side. She states the pain is severe and worse with movement. She denies any alleviating factors. She denies any other complaints.   The patient states she smokes a couple cigarettes daily. She reports occasional alcohol intake. She denies any illicit drug use. She reports a family history of cancer.  Patient was seen in the emergency room and workup in the ER revealed left trimalleolar fracture. The patient was given Dilaudid for pain relief. She was given conscious sedation and her fracture was reduced by the ER MD. A splint cast was applied. Orthopedic surgery was consulted. The patient was also noted to have hypokalemia so she was started on replacement. She was admitted to the Hospitalist group for further evaluation.   Orthopedic surgery consult was completed.        cephalexin, 500 mg, Oral, Q8H  enoxaparin, 40 mg, Subcutaneous, Q24H  levothyroxine, 25 mcg, Oral, Q AM  oxyCODONE, 20 mg, Oral, Q12H  senna-docusate sodium, 2 tablet, Oral, BID  sodium chloride, 10 mL, Intravenous, Q12H  sodium chloride, 3 mL, Intravenous, Q12H       sodium chloride, 75 mL/hr, Last Rate: 75 mL/hr (04/09/22 0639)         Active Hospital Problems:  Active Hospital Problems    Diagnosis    • **Trimalleolar fracture of ankle, closed, left, initial encounter  Follow orthopedic surgery recommendations.      • Acute left ankle pain  Treat with pain control.      • Acute hypokalemia  Replete.      • Physical debility  Treat with PT OT after surgery.      • Fall    • Primary hypertension  Stable.      • Hypothyroidism (acquired)  Treat with Synthroid.      • Tobacco use disorder  Treat with nicotine patch.  Complete tobacco cessation counseling.    Obesity.  Encourage lifestyle modifications.          Continue appropriate patient's home medications for other chronic medical conditions.  Continue the  present level of care.  Patient and family agreed with the plan of care.          HPI, physical examination, assessment and plan were reviewed and no changes were made unless otherwise noted.      DVT prophylaxis:  Medical and mechanical DVT prophylaxis orders are present.    CODE STATUS:    Code Status (Patient has no pulse and is not breathing): CPR (Attempt to Resuscitate)  Medical Interventions (Patient has pulse or is breathing): Full Support      Disposition: Pending patient's clinical improvement.      This patient has been examined wearing appropriate Personal Protective Equipment and discussed with hospital infection control department, [facility], infectious disease specialist and pulmonologist. 04/09/22      Electronically signed by Charles Orta MD, FACP, 04/09/22, 16:45 EDT.      [facility] Hospitalist Team

## 2022-04-09 NOTE — PLAN OF CARE
Goal Outcome Evaluation:      Patient alert oriented able to make needs known. Pain controlled with po pain medication.  at bedside attentive to patient. Continues on IV ABT. Patient on room air.

## 2022-04-09 NOTE — DISCHARGE PLACEMENT REQUEST
"Chante Shane (56 y.o. Female)             Date of Birth   1965    Social Security Number       Address   [address] [place] [address] [place] [facility]    Home Phone   [phone]    MRN   [id number]       Protestant   None    Marital Status                               Admission Date   4/6/22    Admission Type   Emergency    Admitting Provider   Charles Orta MD    Attending Provider   Charles Orta MD    Department, Room/Bed   [facility] SURGICAL INPATIENT, 4117/1       Discharge Date       Discharge Disposition       Discharge Destination                               Attending Provider: Charles Orta MD    Allergies: No Known Allergies    Isolation: None   Infection: None   Code Status: CPR   Advance Care Planning Activity    Ht: 167.6 cm (66\")   Wt: 95.5 kg (210 lb 8.6 oz)    Admission Cmt: None   Principal Problem: Trimalleolar fracture of ankle, closed, left, initial encounter [S82.852A]                 Active Insurance as of 4/6/2022     Primary Coverage     Payor Plan Insurance Group Employer/Plan Group    [facility] [id number]     Payor Plan Address Payor Plan Phone Number Payor Plan Fax Number Effective Dates    [address] [address]   1/1/2022 - None Entered    [facility] [id number]       Subscriber Name Subscriber Birth Date Member ID       LUKASZ SHANE 10/13/1973 [id number]                 Emergency Contacts      (Rel.) Home Phone Work Phone Mobile Phone    LUKASZ SHANE (Spouse) [phone] -- [phone]               History & Physical      Jan Hawkins MD at 04/08/22 1227        H&P reviewed. The patient was examined and there are no changes to the H&P.    Electronically signed by Jan Hawkins MD at 04/08/22 1227   Source Note             Patient ID: Chante Shane is a 56 y.o. female.  Pain control no events overnight      Objective:    /56 (BP Location: Right arm, Patient Position: Lying)   Pulse 71  " " Temp 98.1 °F (36.7 °C) (Oral)   Resp 13   Ht 167.6 cm (66\")   Wt 90.7 kg (200 lb)   SpO2 99%   BMI 32.28 kg/m²     Physical Examination:  Left leg demonstrates a splint in position minimal swelling present no blisters calf soft  Sensory and motor exam are intact in all distributions. Dorsalis pedis and posterior tibialis pulses are palpable and capillary refill is less than two seconds to all digits.       Imaging:      Assessment:    Unstable left ankle fracture  Plan:  Continue ice elevation nonweightbearing.  Mechanical DVT prophylaxis plan for surgery tomorrow n.p.o. at midnight    Procedures    Electronically signed by Jan Hawkins MD at 22 1002             Jan Hawkins MD at 22 1001             Patient ID: Chante Wilson is a 56 y.o. female.  Pain control no events overnight      Objective:    /56 (BP Location: Right arm, Patient Position: Lying)   Pulse 71   Temp 98.1 °F (36.7 °C) (Oral)   Resp 13   Ht 167.6 cm (66\")   Wt 90.7 kg (200 lb)   SpO2 99%   BMI 32.28 kg/m²     Physical Examination:  Left leg demonstrates a splint in position minimal swelling present no blisters calf soft  Sensory and motor exam are intact in all distributions. Dorsalis pedis and posterior tibialis pulses are palpable and capillary refill is less than two seconds to all digits.       Imaging:      Assessment:    Unstable left ankle fracture  Plan:  Continue ice elevation nonweightbearing.  Mechanical DVT prophylaxis plan for surgery tomorrow n.p.o. at midnight    Procedures    Electronically signed by Jan Hawkins MD at 22 1002     Martha Rodriguez APRN at 22 1048     Attestation signed by Charles Orta MD at 22 1440    Electronically signed by Charles Orta MD, 22, 2:40 PM EDT.             I have reviewed this documentation and agree.                      [facility] Medicine Services      Patient Name: Chante Wilson  : " 1965  MRN: [id number]  Primary Care Physician:  lAex Grullon DO  Date of admission: 4/6/2022      Subjective      Chief Complaint:  Left ankle pain    History of Present Illness: Chante Wilson is a 56 y.o. female with a history of hypertension, hypothyroidism, and psoriasis who presented to the ER at [facility] on 4/6/2022 complaining of left ankle pain. The patient states she missed a step and fell this morning. She states she cannot bear weight on her left side. She states the pain is severe and worse with movement. She denies any alleviating factors. She denies any other complaints.     The patient states she smokes a couple cigarettes daily. She reports occasional alcohol intake. She denies any illicit drug use. She reports a family history of cancer.    Workup in the ER revealed left trimalleolar fracture. The patient was given Dilaudid for pain relief. She was given conscious sedation and her fracture was reduced by the ER MD. A splint cast was applied. Orthopedic surgery was consulted. The patient was also noted to have hypokalemia so she was started on replacement. She was admitted to the Hospitalist group for further evaluation.       Review of Systems   Musculoskeletal: Positive for falls and joint pain.        Left ankle   All other systems reviewed and are negative.       Personal History     Past Medical History:   Diagnosis Date   • Essential hypertension    • Hypothyroidism (acquired)    • Psoriasis    • Tobacco abuse        History reviewed. No pertinent surgical history.    Family History: family history includes Cancer (age of onset: 38) in her mother. Otherwise pertinent FHx was reviewed and not pertinent to current issue.    Social History:  reports that she has been smoking cigarettes. She has been smoking about 0.25 packs per day. She has never used smokeless tobacco. She reports current alcohol use. She reports that she does not use drugs.    Home Medications:  Levothyroxine,  lisinopril-HCTZ, Humira       Allergies:  No Known Allergies    Objective      Vitals:   Temp:  [97.9 °F (36.6 °C)] 97.9 °F (36.6 °C)  Heart Rate:  [66-92] 69  Resp:  [15-16] 16  BP: ()/(51-73) 97/53    Physical Exam  Vitals reviewed.   HENT:      Head: Normocephalic.   Eyes:      Extraocular Movements: Extraocular movements intact.      Conjunctiva/sclera: Conjunctivae normal.      Pupils: Pupils are equal, round, and reactive to light.   Cardiovascular:      Rate and Rhythm: Normal rate and regular rhythm.      Pulses: Normal pulses.      Heart sounds: Normal heart sounds.   Pulmonary:      Effort: Pulmonary effort is normal.      Breath sounds: Normal breath sounds.   Abdominal:      General: Bowel sounds are normal. There is no distension.      Palpations: Abdomen is soft.      Tenderness: There is no abdominal tenderness.   Musculoskeletal:      Cervical back: Normal range of motion.      Right lower leg: No edema.      Left lower leg: No edema.      Comments: LLE limited ROM, splint cast in place   Skin:     General: Skin is warm and dry.      Capillary Refill: Capillary refill takes less than 2 seconds.   Neurological:      Mental Status: She is oriented to person, place, and time and easily aroused.      Comments: drowsy   Psychiatric:         Mood and Affect: Mood normal.         Behavior: Behavior normal.          Result Review    Result Review:  I have personally reviewed the results from the time of this admission to 4/6/2022 10:48 EDT and agree with these findings:  [x]  Laboratory  []  Microbiology  [x]  Radiology  [x]  EKG/Telemetry   []  Cardiology/Vascular   []  Pathology  []  Old records  []  Other:     Most notable findings include:   K 2.9, Mg 1.6    EKG:  SR    Left ankle x-ray:  Trimalleolar fracture dislocation.    Follow-up x-ray:  Interval reduction of trimalleolar fracture dislocation with significantly improved alignment at all 3 fractures and reduction of anterior  "dislocation.      Assessment/Plan        Active Hospital Problems:  Active Hospital Problems    Diagnosis    • **Trimalleolar fracture of ankle, closed, left, initial encounter    • Fall    • Acute left ankle pain    • Hypokalemia    • Essential hypertension    • Hypothyroidism (acquired)    • Tobacco abuse      Plan:     Trimalleolar fracture of ankle, closed, left, initial encounter secondary to Fall / Acute left ankle pain  -s/p reduction of dislocation by ER MD  -currently in splint cast  -orthopedic surgery consulted  -elevate LLE  -apply ice as needed  -PRN analgesia     Hypokalemia  -likely secondary to diuretic  -replace per protocol and monitor     Essential hypertension, chronic  -continue ACE-I once dose verified   -hold diuretic for now  -monitor BP    Hypothyroidism (acquired)  -continue levothyroxine once dose verified     Tobacco abuse  -encourage cessation         DVT prophylaxis:  Mechanical DVT prophylaxis orders are present.    CODE STATUS:    Code Status (Patient has no pulse and is not breathing): CPR (Attempt to Resuscitate)  Medical Interventions (Patient has pulse or is breathing): Full Support    Admission Status:  I believe this patient meets inpatient status.    I discussed the patient's findings and my recommendations with patient.    This patient has been examined wearing appropriate Personal Protective Equipment. 04/06/22      Signature: Electronically signed by AURA King, 04/06/22, 10:58 AM EDT.      Electronically signed by Charles Orta MD at 04/06/22 1440          Physician Progress Notes (last 24 hours)      Jan Hawkins MD at 04/09/22 1109            Subjective Patient ID: Chante Wilson is a 56 y.o. female.  Pain controlled still quite numb      Objective:    BP 90/53 (BP Location: Left arm, Patient Position: Lying)   Pulse 81   Temp 97.5 °F (36.4 °C) (Oral)   Resp 17   Ht 167.6 cm (66\")   Wt 95.5 kg (210 lb 8.6 oz)   SpO2 94%   BMI 33.98 kg/m² "     Physical Examination:  Foot is warm and perfused with still numbness from the block.  No pain on passive stretch of the toes.       Imaging:      Assessment:  Doing well after ORIF left ankle    Plan:  Begin physical therapy nonweightbearing.  Gave them instructions for obtaining a knee scooter.  Can be discharged when comfortable if stays overnight transition to oral antibiotics begin DVT prophylaxis when discharge see me in the office in 2 weeks.  If going to rehab can be discharged anytime    Procedures    Electronically signed by Jan Hawkins MD at 22 1111     Charles Orta MD at 22 1723              [facility] Medicine Services Daily Progress Note    Patient Name: Chante Wilson  : 1965  MRN: [id number]  Primary Care Physician:  Alex Grullon DO  Date of admission: 2022      Subjective      Chief Complaint: Left ankle pain.      Patient Reports no new symptoms.      Review of Systems   Constitutional: Negative.   HENT: Negative.    Eyes: Negative.    Cardiovascular: Negative.    Respiratory: Negative.    Endocrine: Negative.    Hematologic/Lymphatic: Negative.    Skin: Negative.    Musculoskeletal: Negative.    Gastrointestinal: Negative.    Genitourinary: Negative.    Neurological: Negative.    Psychiatric/Behavioral: Negative.    Allergic/Immunologic: Negative.             Objective      Vitals:   Temp:  [97 °F (36.1 °C)-99.3 °F (37.4 °C)] 97 °F (36.1 °C)  Heart Rate:  [70-96] 86  Resp:  [14-21] 16  BP: ()/(36-86) 104/68  Flow (L/min):  [2] 2    Physical Exam  Vitals and nursing note reviewed.   Constitutional:       General: She is not in acute distress.  HENT:      Head: Normocephalic and atraumatic.      Nose: Nose normal. No congestion or rhinorrhea.      Mouth/Throat:      Mouth: Mucous membranes are moist.      Pharynx: Oropharynx is clear. No oropharyngeal exudate or posterior oropharyngeal erythema.   Eyes:      Pupils: Pupils are  equal, round, and reactive to light.   Cardiovascular:      Pulses: Normal pulses.      Heart sounds: Normal heart sounds. No murmur heard.    No friction rub. No gallop.      Comments: S1 and S2 present.  No tachycardia.  Pulmonary:      Effort: No respiratory distress.      Breath sounds: No wheezing, rhonchi or rales.   Chest:      Chest wall: No tenderness.   Abdominal:      General: Abdomen is flat. Bowel sounds are normal. There is no distension.      Palpations: Abdomen is soft.      Tenderness: There is no right CVA tenderness.   Musculoskeletal:         General: Tenderness present. No swelling, deformity or signs of injury.      Cervical back: Neck supple. No tenderness.      Right lower leg: No edema.      Left lower leg: No edema.      Comments: Positive left ankle tenderness.   Skin:     Capillary Refill: Capillary refill takes less than 2 seconds.      Coloration: Skin is not jaundiced.      Findings: No bruising, lesion or rash.   Neurological:      Mental Status: She is alert.      Comments: No facial asymmetry noted.  Gait and station not tested.   Psychiatric:      Comments: No agitation.               Result Review    Result Review:  I have personally reviewed the results from the time of this admission to 4/8/2022 17:23 EDT and agree with these findings:  [x]  Laboratory  [x]  Microbiology  [x]  Radiology  []  EKG/Telemetry   []  Cardiology/Vascular   []  Pathology  []  Old records  []  Other:  Most notable findings include:     Complications:no    CT Lower Extremity Left Without Contrast [[id number]] Srinath as Reviewed   Order Status: Completed Collected: 04/06/22 1300    Updated: 04/06/22 1326   Narrative:     CT LOWER EXTREMITY LEFT WO CONTRAST     Date of Exam: 4/6/2022 11:39 EDT     Indication: Fracture, ankle.     Comparison Exams: Ankle radiograph 4/6/2022     Technique: Contiguous axial images obtained through the ankle. Coronal and sagittal reconstructions were performed. Automated exposure  control and iterative reconstruction methods were used.     FINDINGS:   There is improved positioning of the ankle there is now anatomic alignment of the tibiotalar joint. There is a comminuted intra-articular fracture of the posterior malleolus. The fracture fragments measure about 1.8 x 1.1 cm on sagittal view. There are   superiorly displaced by 4 mm. There is a small, 2 mm osseous body in the tibiotalar joint space centrally. There is a displaced fracture of the medial malleolus. The medial mortise is widened and disrupted. The fracture fragment is displaced lateral to   the tibia by about 6 mm. There is an oblique fracture the distal fibula. It is obliquely oriented and slightly comminuted along its superior extent. It enters ankle mortise. The distal fracture fragment is displaced posterior to the proximal fracture   fragment by 3 mm. There is a nondisplaced oblique fracture of the posterior medial malleolus which is congruent with a comminuted displaced posterior malleolar fracture.     There is soft tissue edema. There is suspected disruption of the syndesmotic ligaments. The soft tissues would be better evaluated with MRI examination. The flexor, extensor, peroneal and Achilles tendons are intact. There is a small os trigonum.         Impression:     There is a trimalleolar fracture with improved alignment compared to the acute film. The ankle mortise is disrupted and widened medially. There is a tiny 3 mm intra-articular fracture fragment at the tibiotalar joint.     Electronically Signed: Charo Jiang MD 4/6/2022 13:24 EDT             Wounds (last 24 hours)     LDA Wound     Row Name 04/08/22 1705 04/08/22 1650 04/08/22 1635       Wound 04/08/22 1515 Left anterior ankle    Wound - Properties Group Placement Date: 04/08/22  - Placement Time: 1515  - Side: Left  - Orientation: anterior  -MC, x2 sites  Location: ankle  -MC    Dressing Appearance dry;intact;no drainage  -SD dry;intact;no drainage  -SD  dry;intact;no drainage  -SD    Closure ESTEPHANIE  -SD ESTEPHANIE  -SD ESTEPHANIE  -SD    Retired Wound - Properties Group Placement Date: 04/08/22  - Placement Time: 1515  - Side: Left  - Orientation: anterior  -MC, x2 sites  Location: ankle  -MC    Retired Wound - Properties Group Date first assessed: 04/08/22  - Time first assessed: 1515  - Side: Left  - Location: ankle  -MC    Row Name 04/08/22 1620 04/08/22 1612 04/08/22 1605       Wound 04/08/22 1515 Left anterior ankle    Wound - Properties Group Placement Date: 04/08/22  - Placement Time: 1515  - Side: Left  - Orientation: anterior  -MC, x2 sites  Location: ankle  -MC    Dressing Appearance dry;intact;no drainage  -SD -- --    Closure ESTEPHANIE  -SD -- Sutures  xeroform, 4x4s, kerlix, splint, ace wrap  -    Dressing Care -- dressing applied  baci, xeroform, 4x4s, fluffs, webril, plaster splint  - --    Retired Wound - Properties Group Placement Date: 04/08/22  - Placement Time: 1515  - Side: Left  - Orientation: anterior  -MC, x2 sites  Location: ankle  -    Retired Wound - Properties Group Date first assessed: 04/08/22  - Time first assessed: 1515 - Side: Left  - Location: ankle  -          User Key  (r) = Recorded By, (t) = Taken By, (c) = Cosigned By    Initials Name Provider Type    Nilam Stout, RN Registered Nurse    Dameon Briones, RN Registered Nurse    Ashley Reich RN Registered Nurse             Radiology Results (last 21 days)    Procedure Component Value Units Date/Time   CT Lower Extremity Left Without Contrast [[id number]] Srinath as Reviewed   Order Status: Completed Collected: 04/06/22 1300    Updated: 04/06/22 1326   Narrative:     CT LOWER EXTREMITY LEFT WO CONTRAST     Date of Exam: 4/6/2022 11:39 EDT     Indication: Fracture, ankle.     Comparison Exams: Ankle radiograph 4/6/2022     Technique: Contiguous axial images obtained through the ankle. Coronal and sagittal reconstructions were performed. Automated exposure  control and iterative reconstruction methods were used.     FINDINGS:   There is improved positioning of the ankle there is now anatomic alignment of the tibiotalar joint. There is a comminuted intra-articular fracture of the posterior malleolus. The fracture fragments measure about 1.8 x 1.1 cm on sagittal view. There are   superiorly displaced by 4 mm. There is a small, 2 mm osseous body in the tibiotalar joint space centrally. There is a displaced fracture of the medial malleolus. The medial mortise is widened and disrupted. The fracture fragment is displaced lateral to   the tibia by about 6 mm. There is an oblique fracture the distal fibula. It is obliquely oriented and slightly comminuted along its superior extent. It enters ankle mortise. The distal fracture fragment is displaced posterior to the proximal fracture   fragment by 3 mm. There is a nondisplaced oblique fracture of the posterior medial malleolus which is congruent with a comminuted displaced posterior malleolar fracture.     There is soft tissue edema. There is suspected disruption of the syndesmotic ligaments. The soft tissues would be better evaluated with MRI examination. The flexor, extensor, peroneal and Achilles tendons are intact. There is a small os trigonum.         Impression:     There is a trimalleolar fracture with improved alignment compared to the acute film. The ankle mortise is disrupted and widened medially. There is a tiny 3 mm intra-articular fracture fragment at the tibiotalar joint.     Electronically Signed: Charo Jiang MD 4/6/2022 13:24 EDT           Assessment/Plan    From previous note and with minor updates.  Brief Patient Summary:  Patient is a 56 y.o. female with a history of obesity, tobacco use disorder, psoriasis, hypertension and hypothyroidism who presented to the ER at [facility] on 4/6/2022 complaining of left ankle pain. The patient states she missed a step and fell this morning. She states she cannot  bear weight on her left side. She states the pain is severe and worse with movement. She denies any alleviating factors. She denies any other complaints.   The patient states she smokes a couple cigarettes daily. She reports occasional alcohol intake. She denies any illicit drug use. She reports a family history of cancer.  Patient was seen in the emergency room and workup in the ER revealed left trimalleolar fracture. The patient was given Dilaudid for pain relief. She was given conscious sedation and her fracture was reduced by the ER MD. A splint cast was applied. Orthopedic surgery was consulted. The patient was also noted to have hypokalemia so she was started on replacement. She was admitted to the Hospitalist group for further evaluation.   Orthopedic surgery consult was completed.        [MAR Hold] senna-docusate sodium, 2 tablet, Oral, BID  [MAR Hold] sodium chloride, 10 mL, Intravenous, Q12H       lactated ringers, 125 mL/hr, Last Rate: 125 mL/hr (04/08/22 1237)  phenylephrine, 0.5-3 mcg/kg/min         Active Hospital Problems:  Active Hospital Problems    Diagnosis    • **Trimalleolar fracture of ankle, closed, left, initial encounter  Follow orthopedic surgery recommendations.      • Acute left ankle pain  Treat with pain control.      • Acute hypokalemia  Replete.      • Physical debility  Treat with PT OT after surgery.      • Fall    • Primary hypertension  Stable.      • Hypothyroidism (acquired)  Treat with Synthroid.      • Tobacco use disorder  Treat with nicotine patch.  Complete tobacco cessation counseling.    Obesity.  Encourage lifestyle modifications.          Continue appropriate patient's home medications for other chronic medical conditions.  Continue the present level of care.  Patient and family agreed with the plan of care.          HPI, physical examination, assessment and plan were reviewed and no changes were made unless otherwise noted.      DVT prophylaxis:  Mechanical DVT  prophylaxis orders are present.    CODE STATUS:    Code Status (Patient has no pulse and is not breathing): CPR (Attempt to Resuscitate)  Medical Interventions (Patient has pulse or is breathing): Full Support      Disposition: Pending patient's clinical improvement.      This patient has been examined wearing appropriate Personal Protective Equipment and discussed with hospital infection control department, [facility], infectious disease specialist and pulmonologist. 22      Electronically signed by Charles Orta MD, FACP, 22, 17:23 EDT.      [facility] Hospitalist Team             Electronically signed by Charles Orta MD at 22 1728          Physical Therapy Notes (last 24 hours)      Sylvia Vásquez, PT at 22 1443  Version 1 of 1            22 1441   OTHER   Discipline physical therapist   Rehab Time/Intention   Session Not Performed patient unavailable for evaluation;other (see comments)  (surgery for ORIF right ankle at 1415 on 22)   Recommendation   PT - Next Appointment 22       Electronically signed by Sylvia Vásquez, PT at 22 1443     Suzette Trinh PT at 22 0756  Version 1 of 1         Patient Name: Chante Wilson  : 1965    MRN: [id number]                              Today's Date: 2022       Admit Date: 2022    Visit Dx:     ICD-10-CM ICD-9-CM   1. Trimalleolar fracture of ankle, closed, left, initial encounter  S82.852A 824.6   2. Hypokalemia  E87.6 276.8     Patient Active Problem List   Diagnosis   • Trimalleolar fracture of ankle, closed, left, initial encounter   • Fall   • Acute left ankle pain   • Acute hypokalemia   • Primary hypertension   • Hypothyroidism (acquired)   • Tobacco use disorder   • Physical debility     Past Medical History:   Diagnosis Date   • Essential hypertension    • Hypothyroidism (acquired)    • Psoriasis    • Tobacco abuse      History reviewed. No pertinent surgical history.    General Information     [facility] Name 04/09/22 1002          Physical Therapy Time and Intention    Document Type evaluation  -     Mode of Treatment physical therapy  -     Row Name 04/09/22 1002          General Information    Prior Level of Function independent:;community mobility;transfer;bed mobility;ADL's;driving  -     Existing Precautions/Restrictions weight bearing  LLE NWB  -     Row Name 04/09/22 1002          Living Environment    People in Home spouse  -     Row Name 04/09/22 1002          Home Main Entrance    Number of Stairs, Main Entrance one  -[facility] Name 04/09/22 1002          Stairs Within Home, Primary    Number of Stairs, Within Home, Primary twelve  Bedroom on second floor, but pt is able to stay on the couch on the first floor.  -     Row Name 04/09/22 1002          Cognition    Orientation Status (Cognition) oriented x 4  -[facility] Name 04/09/22 1002          Safety Issues, Functional Mobility    Safety Issues Affecting Function (Mobility) safety precaution awareness  -     Impairments Affecting Function (Mobility) balance;endurance/activity tolerance;strength  -           User Key  (r) = Recorded By, (t) = Taken By, (c) = Cosigned By    Initials Name Provider Type    MARCO A Suzette Trinh, PT Physical Therapist               Mobility     Row Name 04/09/22 1007          Bed Mobility    Bed Mobility supine-sit  -     Supine-Sit [place] (Bed Mobility) modified independence  -     Assistive Device (Bed Mobility) head of bed elevated;bed rails  -[facility] Name 04/09/22 1007          Sit-Stand Transfer    Sit-Stand [place] (Transfers) contact guard;verbal cues  -     Assistive Device (Sit-Stand Transfers) walker, front-wheeled  -[facility] Name 04/09/22 1007          Gait/Stairs (Locomotion)    [place] Level (Gait) contact guard  -     Assistive Device (Gait) walker, front-wheeled  -     Distance in Feet (Gait) 15ft  -     Deviations/Abnormal Patterns  (Gait) bilateral deviations;gait speed decreased  -     Bilateral Gait Deviations heel strike decreased  -     Comment, (Gait/Stairs) Pt demonstrates no LOB or instability during ambulation w/ good use of BUE to maintain NWB.  -     Row Name 04/09/22 1007          Mobility    Extremity Weight-bearing Status left lower extremity  -     Left Lower Extremity (Weight-bearing Status) non weight-bearing (NWB)  -           User Key  (r) = Recorded By, (t) = Taken By, (c) = Cosigned By    Initials Name Provider Type     Suzette Trinh PT Physical Therapist               Obj/Interventions     Row Name 04/09/22 1009          Range of Motion Comprehensive    General Range of Motion bilateral upper extremity ROM WFL;bilateral lower extremity ROM WFL  -     Row Name 04/09/22 1009          Strength Comprehensive (MMT)    Comment, General Manual Muscle Testing (MMT) Assessment BUE 4+/5, BLE grossly 4+/5 (ESTEPHANIE L ankle)  -     Row Name 04/09/22 1009          Balance    Balance Assessment sitting static balance;standing static balance;standing dynamic balance;sitting dynamic balance  -     Static Sitting Balance supervision  -     Dynamic Sitting Balance standby assist  -     Position, Sitting Balance unsupported;sitting edge of bed  -     Static Standing Balance standby assist  -     Dynamic Standing Balance contact guard  Unable to reach outside of ISSAC to L w/out assist  -     Position/Device Used, Standing Balance supported;walker, rolling  -     Row Name 04/09/22 1009          Sensory Assessment (Somatosensory)    Sensory Assessment (Somatosensory) other (see comments)  Pt reports numbness to LLE below her knee; All others intact  -           User Key  (r) = Recorded By, (t) = Taken By, (c) = Cosigned By    Initials Name Provider Type    Suzette Colbert PT Physical Therapist               Goals/Plan     Row Name 04/09/22 1021          Bed Mobility Goal 1 (PT)    Activity/Assistive Device (Bed  Mobility Goal 1, PT) bed mobility activities, all  -MARCO A     [place] Level/Cues Needed (Bed Mobility Goal 1, PT) independent  -MARCO A     Time Frame (Bed Mobility Goal 1, PT) long term goal (LTG);2 weeks  -MARCO A     Row Name 04/09/22 1021          Transfer Goal 1 (PT)    Activity/Assistive Device (Transfer Goal 1, PT) transfers, all  -MARCO A     [place] Level/Cues Needed (Transfer Goal 1, PT) independent  -MARCO A     Time Frame (Transfer Goal 1, PT) long term goal (LTG);2 weeks  -MARCO A     Row Name 04/09/22 1021          Gait Training Goal 1 (PT)    Activity/Assistive Device (Gait Training Goal 1, PT) gait (walking locomotion)  -MARCO A     [place] Level (Gait Training Goal 1, PT) independent  -MARCO A     Distance (Gait Training Goal 1, PT) 50ft  -MARCO A     Time Frame (Gait Training Goal 1, PT) long term goal (LTG);2 weeks  -MARCO A     Row Name 04/09/22 1021          Stairs Goal 1 (PT)    Activity/Assistive Device (Stairs Goal 1, PT) stairs, all skills  -MARCO A     Number of Stairs (Stairs Goal 1, PT) 1 step  -MARCO A     Time Frame (Stairs Goal 1, PT) long term goal (LTG);2 weeks  -MARCO A           User Key  (r) = Recorded By, (t) = Taken By, (c) = Cosigned By    Initials Name Provider Type    Suzette Colbert, PT Physical Therapist               Clinical Impression     Row Name 04/09/22 1012          Pain    Pretreatment Pain Rating 0/10 - no pain  -MARCO A     Posttreatment Pain Rating 0/10 - no pain  -MARCO A     Row Name 04/09/22 1012          Plan of Care Review    Plan of Care Reviewed With patient;spouse  -MARCO A     Outcome Evaluation Pt is a 55 y/o female who presents to [facility] s/p L ankle ORIF d/t trimalleolar fx on 4/8/22 after falling down her stairs. PMH includes but is not limited to tobacco use disorder, psoriasis, hypertension and hypothyroidism. At [facility] pt lives in a two story home with her spouse and has 1 step to enter her home. She would be able to stay on the first floor if needed. At this time pt demonstrates increased anxiety with ability  to navigate her home without assist. She requires v/c for increased safety awareness, but is able to maintain LLE NWB during STS transfers and with ambulation x15ft using RW w/ CGA. She reports she does not have 24/7 assist and will only have someone at night for assist. Recommend ACUTE inpatient rehab at d/c as she demonstrates decreased safety awareness and impaired balance to return home alone at this time.  -     Row Name 04/09/22 1012          Therapy Assessment/Plan (PT)    Predicted Duration of Therapy Intervention (PT) Until d/c  -     Row Name 04/09/22 1012          Vital Signs    O2 Delivery Pre Treatment room air  -     O2 Delivery Intra Treatment room air  -MARCO A     O2 Delivery Post Treatment room air  -     Row Name 04/09/22 1012          Positioning and Restraints    Pre-Treatment Position in bed  -MARCO A     Post Treatment Position chair  -MARCO A     In Chair reclined;call light within reach;encouraged to call for assist;exit alarm on;notified nsg;with family/caregiver  -MARCO A           User Key  (r) = Recorded By, (t) = Taken By, (c) = Cosigned By    Initials Name Provider Type    Suzette Colbert, PT Physical Therapist               Outcome Measures     Row Name 04/09/22 1021          How much help from another person do you currently need...    Turning from your back to your side while in flat bed without using bedrails? 3  -MARCO A     Moving from lying on back to sitting on the side of a flat bed without bedrails? 3  -MARCO A     Moving to and from a bed to a chair (including a wheelchair)? 3  -MARCO A     Standing up from a chair using your arms (e.g., wheelchair, bedside chair)? 3  -MARCO A     Climbing 3-5 steps with a railing? 2  -MARCO A     To walk in hospital room? 2  -MARCO A     AM-PAC 6 Clicks Score (PT) 16  -     Row Name 04/09/22 1021          Functional Assessment    Outcome Measure Options AM-PAC 6 Clicks Basic Mobility (PT)  -           User Key  (r) = Recorded By, (t) = Taken By, (c) = Cosigned By    Initials  Name Provider Type    MARCO A Suzette Trinh, PT Physical Therapist                             Physical Therapy Education                 Title: PT OT SLP Therapies (Done)     Topic: Physical Therapy (Done)     Point: Mobility training (Done)     Learning Progress Summary           Patient Acceptance, E,TB, VU by  at 4/9/2022 1022                   Point: Home exercise program (Done)     Learning Progress Summary           Patient Acceptance, E,TB, VU by  at 4/9/2022 1022                   Point: Body mechanics (Done)     Learning Progress Summary           Patient Acceptance, E,TB, VU by  at 4/9/2022 1022                   Point: Precautions (Done)     Learning Progress Summary           Patient Acceptance, E,TB, VU by  at 4/9/2022 1022                               User Key     Initials Effective Dates Name Provider Type Discipline    MARCO A 08/23/21 -  Suzette Trinh PT Physical Therapist PT              PT Recommendation and Plan  Planned Therapy Interventions (PT): bed mobility training, balance training, gait training, home exercise program, motor coordination training, neuromuscular re-education, patient/family education, stair training, strengthening, transfer training  Plan of Care Reviewed With: patient, spouse  Outcome Evaluation: Pt is a 57 y/o female who presents to [facility] s/p L ankle ORIF d/t trimalleolar fx on 4/8/22 after falling down her stairs. PMH includes but is not limited to tobacco use disorder, psoriasis, hypertension and hypothyroidism. At [facility] pt lives in a two story home with her spouse and has 1 step to enter her home. She would be able to stay on the first floor if needed. At this time pt demonstrates increased anxiety with ability to navigate her home without assist. She requires v/c for increased safety awareness, but is able to maintain LLE NWB during STS transfers and with ambulation x15ft using RW w/ CGA. She reports she does not have 24/7 assist and will only have someone at night  for assist. Recommend ACUTE inpatient rehab at [facility] as she demonstrates decreased safety awareness and impaired balance to return home alone at this time.     Time Calculation:    PT Charges     Row Name 04/09/22 1023             Time Calculation    Start Time 0756  -MARCO A      Stop Time 0818  -MARCO A      Time Calculation (min) 22 min  -MARCO A      PT Received On 04/09/22  -MARCO A      PT - Next Appointment 04/11/22  -MARCO A      PT Goal Re-Cert Due Date 04/23/22  -MARCO A            User Key  (r) = Recorded By, (t) = Taken By, (c) = Cosigned By    Initials Name Provider Type    Suzette Colbert, PT Physical Therapist              Therapy Charges for Today     Code Description Service Date Service Provider Modifiers Qty    [id number] HC PT EVAL MOD COMPLEXITY 4 4/9/2022 Suzette Trinh, PT GP 1          PT G-Codes  Outcome Measure Options: AM-PAC 6 Clicks Basic Mobility (PT)  AM-PAC 6 Clicks Score (PT): 16    Suzette Trinh PT  4/9/2022      Electronically signed by Suzette Trinh PT at 04/09/22 1024     Suzette Trinh PT at 04/09/22 1022  Version 1 of 1       Goal Outcome Evaluation:    Outcome Evaluation: Pt is a 57 y/o female who presents to [facility] s/p L ankle ORIF d/t trimalleolar fx on 4/8/22 after falling down her stairs. PMH includes but is not limited to tobacco use disorder, psoriasis, hypertension and hypothyroidism. At [facility] pt lives in a two story home with her spouse and has 1 step to enter her home. She would be able to stay on the first floor if needed. At this time pt demonstrates increased anxiety with ability to navigate her home without assist. She requires v/c for increased safety awareness, but is able to maintain LLE NWB during STS transfers and with ambulation x15ft using RW w/ CGA. She reports she does not have 24/7 assist and will only have someone at night for assist. Recommend ACUTE inpatient rehab at [facility] as she demonstrates decreased safety awareness and impaired balance to return home alone at this  time.    Electronically signed by Suzette Trinh, PT at 04/09/22 7094

## 2022-04-10 VITALS
BODY MASS INDEX: 33.84 KG/M2 | SYSTOLIC BLOOD PRESSURE: 95 MMHG | RESPIRATION RATE: 14 BRPM | HEIGHT: 66 IN | DIASTOLIC BLOOD PRESSURE: 62 MMHG | TEMPERATURE: 97.8 F | HEART RATE: 79 BPM | WEIGHT: 210.54 LBS | OXYGEN SATURATION: 99 %

## 2022-04-10 LAB
MAGNESIUM SERPL-MCNC: 2.1 MG/DL (ref 1.6–2.6)
POTASSIUM SERPL-SCNC: 4.1 MMOL/L (ref 3.5–5.2)

## 2022-04-10 PROCEDURE — 84132 ASSAY OF SERUM POTASSIUM: CPT | Performed by: ORTHOPAEDIC SURGERY

## 2022-04-10 PROCEDURE — 97166 OT EVAL MOD COMPLEX 45 MIN: CPT

## 2022-04-10 PROCEDURE — G0378 HOSPITAL OBSERVATION PER HR: HCPCS

## 2022-04-10 PROCEDURE — 99217 PR OBSERVATION CARE DISCHARGE MANAGEMENT: CPT | Performed by: INTERNAL MEDICINE

## 2022-04-10 PROCEDURE — 36415 COLL VENOUS BLD VENIPUNCTURE: CPT | Performed by: ORTHOPAEDIC SURGERY

## 2022-04-10 PROCEDURE — 83735 ASSAY OF MAGNESIUM: CPT | Performed by: ORTHOPAEDIC SURGERY

## 2022-04-10 RX ADMIN — CEPHALEXIN 500 MG: 500 CAPSULE ORAL at 06:33

## 2022-04-10 RX ADMIN — HYDROCODONE BITARTRATE AND ACETAMINOPHEN 1 TABLET: 5; 325 TABLET ORAL at 08:14

## 2022-04-10 RX ADMIN — OXYCODONE HYDROCHLORIDE 20 MG: 20 TABLET, FILM COATED, EXTENDED RELEASE ORAL at 08:14

## 2022-04-10 RX ADMIN — LEVOTHYROXINE SODIUM 25 MCG: 0.03 TABLET ORAL at 06:32

## 2022-04-10 RX ADMIN — SENNOSIDES AND DOCUSATE SODIUM 2 TABLET: 50; 8.6 TABLET ORAL at 08:14

## 2022-04-10 RX ADMIN — HYDROCODONE BITARTRATE AND ACETAMINOPHEN 1 TABLET: 5; 325 TABLET ORAL at 00:12

## 2022-04-10 NOTE — DISCHARGE INSTRUCTIONS
Patient was advised to follow-up with her primary care physician who will review her current medications.    Patient was advised to follow-up with orthopedic surgeon who will reassess her recovery from her trimalleolar fracture.

## 2022-04-10 NOTE — THERAPY EVALUATION
Patient Name: Chante Wilson  : 1965    MRN: [id number]                              Today's Date: 4/10/2022       Admit Date: 2022    Visit Dx:     ICD-10-CM ICD-9-CM   1. Trimalleolar fracture of ankle, closed, left, initial encounter  S82.852A 824.6   2. Hypokalemia  E87.6 276.8     Patient Active Problem List   Diagnosis   • Trimalleolar fracture of ankle, closed, left, initial encounter   • Fall   • Acute left ankle pain   • Acute hypokalemia   • Primary hypertension   • Hypothyroidism (acquired)   • Tobacco use disorder   • Physical debility     Past Medical History:   Diagnosis Date   • Essential hypertension    • Hypothyroidism (acquired)    • Psoriasis    • Tobacco abuse      History reviewed. No pertinent surgical history.   General Information     Row Name 04/10/22 1204          General Information    Prior Level of Function independent:;community mobility;home management  spouse works FT away from home, sometimes for > 24 hrs, and pt enjoys caring for her grandchildren, jane. a 5 y/o girl who she likes to cook and clean with.  -     Existing Precautions/Restrictions non-weight bearing  Lt ankle  -     Barriers to Rehab none identified  -     Row Name 04/10/22 1204          Living Environment    People in Home spouse  -     Row Name 04/10/22 1204          Home Main Entrance    Number of Stairs, Main Entrance one  -     Row Name 04/10/22 1204          Stairs Within Home, Primary    Stairs, Within Home, Primary will have living quarters available on main level.  -     Number of Stairs, Within Home, Primary other (see comments)  -     Row Name 04/10/22 1204          Cognition    Orientation Status (Cognition) oriented x 4  -     Row Name 04/10/22 1204          Safety Issues, Functional Mobility    Impairments Affecting Function (Mobility) balance;endurance/activity tolerance  -           User Key  (r) = Recorded By, (t) = Taken By, (c) = Cosigned By    Initials Name Provider Type      Veronica Rao OT Occupational Therapist                 Mobility/ADL's     Row Name 04/10/22 1248          Bed Mobility    Bed Mobility supine-sit  -     Supine-Sit [place] (Bed Mobility) modified independence  -     Row Name 04/10/22 1248          Transfers    Transfers sit-stand transfer;stand-sit transfer  -     Sit-Stand [place] (Transfers) set up;verbal cues  -     Stand-Sit [place] (Transfers) verbal cues;modified independence  -     Row Name 04/10/22 1248          Sit-Stand Transfer    Assistive Device (Sit-Stand Transfers) walker, front-wheeled  -     Row Name 04/10/22 1248          Functional Mobility    Functional Mobility- Ind. Level conditional independence  -     Functional Mobility- Device rolling walker  -     Functional Mobility- Comment pt has already ordered a knee scooter. may benefit from RW as well to enable functional mobility in a variety of situations.  -     Row Name 04/10/22 1248          Activities of Daily Living    BADL Assessment/Intervention toileting  -     Row Name 04/10/22 1248          Mobility    Extremity Weight-bearing Status left lower extremity  -     Left Lower Extremity (Weight-bearing Status) non weight-bearing (NWB)  -     Row Name 04/10/22 1248          Stand-Sit Transfer    Assistive Device (Stand-Sit Transfers) walker, front-wheeled  -     Row Name 04/10/22 1248          Toileting Assessment/Training    [place] Level (Toileting) toileting skills;modified independence  -     Comment, (Toileting) has ordered a BSC to place over their low toilet.  -           User Key  (r) = Recorded By, (t) = Taken By, (c) = Cosigned By    Initials Name Provider Type     Veronica Rao OT Occupational Therapist               Obj/Interventions     Row Name 04/10/22 1250          Sensory Assessment (Somatosensory)    Sensory Assessment LLE numbness  -     Row Name 04/10/22 1250          Vision Assessment/Intervention    Visual  Impairment/Limitations WFL  -[facility] Name 04/10/22 1250          Range of Motion Comprehensive    General Range of Motion no range of motion deficits identified  -[facility] Name 04/10/22 1250          Strength Comprehensive (MMT)    General Manual Muscle Testing (MMT) Assessment no strength deficits identified  -     Comment, General Manual Muscle Testing (MMT) Assessment untested lt ankle  -     Row Name 04/10/22 1250          Balance    Balance Assessment sitting static balance;sitting dynamic balance;standing static balance;standing dynamic balance  -     Static Sitting Balance independent  -     Dynamic Sitting Balance independent  -     Static Standing Balance modified independence  -     Dynamic Standing Balance modified independence  -     Position/Device Used, Standing Balance walker, rolling  -           User Key  (r) = Recorded By, (t) = Taken By, (c) = Cosigned By    Initials Name Provider Type    Veronica Munguia OT Occupational Therapist               Goals/Plan    No documentation.                Clinical Impression     [facility] Name 04/10/22 1251          Pain Assessment    Pretreatment Pain Rating 0/10 - no pain  -     Posttreatment Pain Rating 0/10 - no pain  -[facility] Name 04/10/22 1251          Plan of Care Review    Plan of Care Reviewed With patient;spouse  -     Progress improving  -     Outcome Evaluation Pt is admitted after falling down her stairs at home. She has acute Lt ankle trimalleolar Fx s/p ORIF and is to be NWB for 2 weeks. Pt is demonstrating good modified functional mobility using RW and has ordered a BSC and knee scooter. Recommend home w/ HHC and RW.  -     Row Name 04/10/22 1251          Therapy Assessment/Plan (OT)    Therapy Frequency (OT) evaluation only  -[facility] Name 04/10/22 1251          Therapy Plan Review/Discharge Plan (OT)    Equipment Needs Upon Discharge (OT) walker, rolling  -     Anticipated Discharge Disposition (OT) home with  home health  -     Row Name 04/10/22 1251          Vital Signs    O2 Delivery Pre Treatment room air  -     Pre Patient Position Supine  -     Intra Patient Position Standing  -     Post Patient Position Sitting  -     Row Name 04/10/22 1251          Positioning and Restraints    Pre-Treatment Position in bed  -     Post Treatment Position bed  -MH     In Bed notified nsg;sitting EOB;call light within reach;with family/caregiver  -           User Key  (r) = Recorded By, (t) = Taken By, (c) = Cosigned By    Initials Name Provider Type     Veronica Rao OT Occupational Therapist               Outcome Measures    No documentation.                   OT Recommendation and Plan  Therapy Frequency (OT): evaluation only  Plan of Care Review  Plan of Care Reviewed With: patient, spouse  Progress: improving  Outcome Evaluation: Pt is admitted after falling down her stairs at home. She has acute Lt ankle trimalleolar Fx s/p ORIF and is to be NWB for 2 weeks. Pt is demonstrating good modified functional mobility using RW and has ordered a BSC and knee scooter. Recommend home w/ HHC and RW.     Time Calculation:    Time Calculation- OT     Row Name 04/10/22 1254             Time Calculation- OT    OT Start Time 1037  -      OT Stop Time 1100  -      OT Time Calculation (min) 23 min  -      Total Timed Code Minutes- OT 0 minute(s)  -      OT Received On 04/10/22  -            User Key  (r) = Recorded By, (t) = Taken By, (c) = Cosigned By    Initials Name Provider Type     Veronica Rao OT Occupational Therapist              Therapy Charges for Today     Code Description Service Date Service Provider Modifiers Qty    [id number]  OT EVAL MOD COMPLEXITY 3 4/10/2022 Veronica Rao OT GO 1               Veronica Rao OT  4/10/2022

## 2022-04-10 NOTE — PLAN OF CARE
Goal Outcome Evaluation:  Plan of Care Reviewed With: patient, spouse        Progress: improving  Outcome Evaluation: Pt is admitted after falling down her stairs at home. She has acute Lt ankle trimalleolar Fx s/p ORIF and is to be NWB for 2 weeks. Pt is demonstrating good modified functional mobility using RW and has ordered a BSC and knee scooter. Recommend home w/ HHC and RW.

## 2022-04-10 NOTE — DISCHARGE SUMMARY
[facility] Medicine Services  DISCHARGE SUMMARY    Patient Name: Chante Wilson  : 1965  MRN: [id number]    Date of Admission: 2022  Discharge Diagnosis: Trimalleolar fracture.  Date of Discharge: 4/10/2022  Primary Care Physician: Alex Grullon DO      Presenting Problem:   Hypokalemia [E87.6]  Trimalleolar fracture of ankle, closed, left, initial encounter [S82.852A]    Active and Resolved Hospital Problems:  Active Hospital Problems    Diagnosis POA   • **Trimalleolar fracture of ankle, closed, left, initial encounter [S82.852A] Yes     Priority: High   • Acute left ankle pain [M25.572] Yes     Priority: Medium   • Acute hypokalemia [E87.6] Yes     Priority: Low   • Physical debility [R53.81] Yes   • Fall [W19.XXXA] Yes   • Primary hypertension [I10] Yes   • Hypothyroidism (acquired) [E03.9] Yes   • Tobacco use disorder [F17.200] Yes      Resolved Hospital Problems   No resolved problems to display.         Hospital Course     Hospital Course:  Patient is a 56 y.o. female with a history of hypothyroidism, obesity, tobacco use disorder, psoriasis and hypertension  who presented to the ER at [facility] on 2022 complaining of left ankle pain. The patient states she missed a step and fell this morning. She states she cannot bear weight on her left side. She states the pain is severe and worse with movement. She denies any alleviating factors. She denies any other complaints.   The patient states she smokes a couple cigarettes daily. She reports occasional alcohol intake. She denies any illicit drug use. She reports a family history of cancer.  Patient was seen in the emergency room and workup in the ER revealed left trimalleolar fracture. The patient was given Dilaudid for pain relief. She was given conscious sedation and her fracture was reduced by the ER MD. A splint cast was applied. Orthopedic surgery was consulted. The patient was also noted to have  hypokalemia so she was started on replacement. She was admitted to the Hospitalist group for further evaluation.   Orthopedic surgery consult was completed.  Trimalleolar fracture left ankle was treated with ORIF.  Acute hypokalemia was treated with potassium replacement.  Physical debility was treated with physical therapy.  Hypertension was stable.  Hypothyroidism was treated with Synthroid.  Tobacco cessation counseling was completed because of tobacco use disorder.  Lifestyle modifications were encouraged because of obesity.  Appropriate patient's home medications were resumed in the hospital for other chronic medical conditions.  Patient reported significant improvement in her symptoms after over 4 days in the hospital and requested to be discharged home.  Patient was advised to take your medications as prescribed.  Discharge medications are as per medication reconciliation list.  Patient was advised to follow-up with her primary care physician within 3 to 5 days of discharge.  Patient was advised to follow-up with the orthopedic surgeon within 10 days of discharge.  Patient was advised to return to the emergency department if she experiences any recurrence of her symptoms.  Patient and family agreed with the plan and patient was discharged in stable condition.              DISCHARGE Follow Up Recommendations for labs and diagnostics:    Patient was advised to follow-up with her primary care physician who will review her current medications.    Patient was advised to follow-up with orthopedic surgeon who will reassess her recovery from trimalleolar fracture of the left ankle.      Reasons For Change In Medications and Indications for New Medications:      Day of Discharge     Vital Signs:  Temp:  [97.6 °F (36.4 °C)-98.2 °F (36.8 °C)] 97.6 °F (36.4 °C)  Heart Rate:  [79] 79  Resp:  [16] 16  BP: (104-111)/(46-74) 111/74    Physical Exam:  Physical Exam  Vitals and nursing note reviewed.   Constitutional:        General: She is not in acute distress.  HENT:      Head: Normocephalic and atraumatic.      Nose: Nose normal. No congestion or rhinorrhea.      Mouth/Throat:      Mouth: Mucous membranes are moist.      Pharynx: Oropharynx is clear. No oropharyngeal exudate or posterior oropharyngeal erythema.   Eyes:      Pupils: Pupils are equal, round, and reactive to light.   Cardiovascular:      Pulses: Normal pulses.      Heart sounds: Normal heart sounds. No murmur heard.    No friction rub. No gallop.      Comments: S1 and S2 present.  No tachycardia.  Pulmonary:      Effort: No respiratory distress.      Breath sounds: No wheezing, rhonchi or rales.   Chest:      Chest wall: No tenderness.   Abdominal:      General: Abdomen is flat. Bowel sounds are normal. There is no distension.      Palpations: Abdomen is soft.      Tenderness: There is no right CVA tenderness.   Musculoskeletal:         General: No swelling, tenderness, deformity or signs of injury.      Cervical back: Neck supple. No tenderness.      Right lower leg: No edema.      Left lower leg: No edema.   Skin:     Capillary Refill: Capillary refill takes less than 2 seconds.      Coloration: Skin is not jaundiced.      Findings: No bruising, lesion or rash.   Neurological:      Mental Status: She is alert.      Comments: No facial asymmetry noted.  Gait and station not tested.   Psychiatric:      Comments: No agitation.              Pertinent  and/or Most Recent Results     LAB RESULTS:      Lab 04/08/22 2042 04/06/22  0937   WBC 3.30* 5.60   HEMOGLOBIN 11.6* 13.0   HEMATOCRIT 33.1* 36.8   PLATELETS 180 202   NEUTROS ABS 3.00 4.20   LYMPHS ABS 0.30* 0.90   MONOS ABS 0.00* 0.40   EOS ABS 0.00 0.10   MCV 88.0 87.2         Lab 04/10/22  0349 04/09/22  0338 04/08/22 2042 04/08/22  0413 04/07/22  0345 04/06/22  0937   SODIUM  --   --  140 137 136 138   POTASSIUM 4.1 4.6 3.6 4.2 3.6 2.9*   CHLORIDE  --   --  106 106 102 107   CO2  --   --  22.0 21.0* 25.0 21.0*    ANION GAP  --   --  12.0 10.0 9.0 10.0   BUN  --   --  8 7 9 10   CREATININE  --   --  0.93 1.02* 1.15* 0.74   EGFR  --   --  71.4 63.9 55.3* 93.9   GLUCOSE  --   --  166* 95 110* 117*   CALCIUM  --   --  8.2* 8.4* 8.6 7.5*   MAGNESIUM 2.1 1.9  --  2.0 1.8 1.6         Lab 04/06/22  0937   TOTAL PROTEIN 5.8*   ALBUMIN 3.40*   GLOBULIN 2.4   ALT (SGPT) 37*   AST (SGOT) 33*   BILIRUBIN 0.4   ALK PHOS 91                 Lab 04/06/22  1022   ABO TYPING O   RH TYPING Positive   ANTIBODY SCREEN Negative         Brief Urine Lab Results     None        Microbiology Results (last 10 days)     Procedure Component Value - Date/Time    COVID PRE-OP / PRE-PROCEDURE SCREENING ORDER (NO ISOLATION) - Swab, Nasopharynx [[id number]]  (Normal) Collected: 04/06/22 1027    Lab Status: Final result Specimen: Swab from Nasopharynx Updated: 04/06/22 1119    Narrative:      The following orders were created for panel order COVID PRE-OP / PRE-PROCEDURE SCREENING ORDER (NO ISOLATION) - Swab, Nasopharynx.  Procedure                               Abnormality         Status                     ---------                               -----------         ------                     COVID-19,CEPHEID/MARIO,CO...[[id number]]  Normal              Final result                 Please view results for these tests on the individual orders.    COVID-19,CEPHEID/MARIO,COR/MAKAYLA/PAD/FIORELLA IN-HOUSE(OR EMERGENT/ADD-ON),NP SWAB IN TRANSPORT MEDIA 3-4 HR TAT, RT-PCR - Swab, Nasopharynx [[id number]]  (Normal) Collected: 04/06/22 1027    Lab Status: Final result Specimen: Swab from Nasopharynx Updated: 04/06/22 1119     COVID19 Not Detected    Narrative:      Fact sheet for providers: https://www.fda.gov/media/835411/download     Fact sheet for patients: https://www.fda.gov/media/950058/download  Fact sheet for providers: https://www.fda.gov/media/076046/download     Fact sheet for patients: https://www.fda.gov/media/188177/download          XR Ankle 2 View Left    Result  Date: 4/6/2022  Impression: Interval reduction of trimalleolar fracture dislocation with significantly improved alignment at all 3 fractures and reduction of anterior dislocation.  Electronically Signed By-Taran Eaton MD On:4/6/2022 10:29 AM This report was finalized on [id number] by  Taran Eaton MD.    XR Ankle 3+ View Left    Result Date: 4/6/2022  Impression: Trimalleolar fracture dislocation.  Electronically Signed By-Taran Eaton MD On:4/6/2022 9:37 AM This report was finalized on [id number] by  Taran Eaton MD.    XR Chest 1 View    Result Date: 4/6/2022  Impression: No acute cardiopulmonary abnormality.  Electronically Signed By-Taran Eaton MD On:4/6/2022 10:29 AM This report was finalized on [id number] by  Taran Eaton MD.    CT Lower Extremity Left Without Contrast    Result Date: 4/6/2022  Impression: There is a trimalleolar fracture with improved alignment compared to the acute film. The ankle mortise is disrupted and widened medially. There is a tiny 3 mm intra-articular fracture fragment at the tibiotalar joint. Electronically Signed: Charo Jiang MD 4/6/2022 13:24 EDT                  Labs Pending at Discharge:      Procedures Performed  Procedure(s):  ANKLE OPEN REDUCTION INTERNAL FIXATION         Consults:   Consults     Date and Time Order Name Status Description    4/6/2022  9:58 AM Ortho (on-call MD unless specified) Completed             Discharge Details        Discharge Medications      New Medications      Instructions Start Date   cephalexin 500 MG capsule  Commonly known as: Keflex   500 mg, Oral, 4 Times Daily      oxyCODONE-acetaminophen 5-325 MG per tablet  Commonly known as: Percocet   1 tablet, Oral, Every 6 Hours PRN      promethazine 12.5 MG tablet  Commonly known as: PHENERGAN   12.5 mg, Oral, Every 6 Hours PRN         Continue These Medications      Instructions Start Date   BH IP RX LISINOPRIL/HCTZ COMBO PANEL   Oral, Daily, 20/12.5      Humira 40 MG/0.4ML  Prefilled Syringe Kit injection  Generic drug: Adalimumab   40 mg, Subcutaneous, Once, Every 2 weeks      levothyroxine 25 MCG tablet  Commonly known as: SYNTHROID, LEVOTHROID   25 mcg, Oral, Daily      melatonin 3 MG tablet   3 mg, Oral, gummy             No Known Allergies      Discharge Disposition: Stable.  Home or Self Care    Diet:  Hospital:  Diet Order   Procedures   • Diet Regular         Discharge Activity: As tolerated.        CODE STATUS:  Code Status and Medical Interventions:   Ordered at: 04/06/22 1045     Code Status (Patient has no pulse and is not breathing):    CPR (Attempt to Resuscitate)     Medical Interventions (Patient has pulse or is breathing):    Full Support         No future appointments.        Time spent on Discharge including face to face service: 40 minutes    This patient has been examined wearing appropriate Personal Protective Equipment and discussed with hospital infection control department, [facility] department, infectious disease specialist and pulmonologist. 04/10/22      Signature: Electronically signed by Charles Orta MD, FACP, 04/10/22, 11:31 AM EDT.

## 2022-04-10 NOTE — PLAN OF CARE
Goal Outcome Evaluation:      Patient alert oriented able to make needs known. Pain controlled with po pain medication. Patient on room air. Continues on ABT.

## 2022-04-11 NOTE — CASE MANAGEMENT/SOCIAL WORK
Case Management Discharge Note           Provided Post Acute Provider List?: Yes  Post Acute Provider List: Home Health  Delivered To: Patient  Method of Delivery: In person    Selected Continued Care - Discharged on 4/10/2022 Admission date: 4/6/2022 - Discharge disposition: Home or Self Care            Transportation Services  Private: Car

## 2022-04-25 ENCOUNTER — OFFICE VISIT (OUTPATIENT)
Dept: ORTHOPEDIC SURGERY | Facility: CLINIC | Age: 57
End: 2022-04-25

## 2022-04-25 VITALS
BODY MASS INDEX: 33.75 KG/M2 | DIASTOLIC BLOOD PRESSURE: 77 MMHG | WEIGHT: 210 LBS | SYSTOLIC BLOOD PRESSURE: 114 MMHG | HEART RATE: 92 BPM | HEIGHT: 66 IN

## 2022-04-25 DIAGNOSIS — M25.572 ACUTE LEFT ANKLE PAIN: Primary | ICD-10-CM

## 2022-04-25 DIAGNOSIS — Z47.89 ORTHOPEDIC AFTERCARE: ICD-10-CM

## 2022-04-25 PROCEDURE — 99024 POSTOP FOLLOW-UP VISIT: CPT | Performed by: ORTHOPAEDIC SURGERY

## 2022-04-25 PROCEDURE — 97760 ORTHOTIC MGMT&TRAING 1ST ENC: CPT | Performed by: ORTHOPAEDIC SURGERY

## 2022-04-25 RX ORDER — CLOBETASOL PROPIONATE 0.5 MG/G
CREAM TOPICAL
COMMUNITY
Start: 2022-04-20

## 2022-04-25 RX ORDER — ADALIMUMAB 40MG/0.4ML
KIT SUBCUTANEOUS
COMMUNITY
Start: 2022-04-12 | End: 2022-07-07

## 2022-04-25 RX ORDER — LISINOPRIL AND HYDROCHLOROTHIAZIDE 20; 12.5 MG/1; MG/1
TABLET ORAL
COMMUNITY
Start: 2022-03-30

## 2022-04-25 RX ORDER — ASPIRIN 325 MG
325 TABLET ORAL DAILY
COMMUNITY
End: 2022-05-23

## 2022-04-25 NOTE — PROGRESS NOTES
"     Patient ID: Chante Wilson is a 56 y.o. female.    4/8/22 ORIF left ankle fracture  Pain controlled, on aspirin  Objective:    /77   Pulse 92   Ht 167.6 cm (66\")   Wt 95.3 kg (210 lb)   BMI 33.89 kg/m²     Physical Examination:    Incisions well approximated no sign of infection.  Fracture blisters are covered with good epithelial tissue.  Lauren negative.  Mild tingling to the dorsum of the foot but intact gross sensation and capillary refill    Imaging:  left Ankle X-Ray  Indication: Postop ORIF ankle  Views: AP, Mortise Lateral  Findings: Maintenance of fracture and mortise reduction hardware position  yes fracture  no bony lesion  Soft tissues normal  normal joint spaces  Hardware appropriately positioned yes    yes prior studies available for comparison.    Assessment:  Doing well after ORIF left ankle    Plan:  Staples removed, okay to shower cam boot fitted today begin therapy nonweightbearing for ankle range of motion discontinue aspirin x-ray in a month  Greater than 15 minutes was spent demonstrating proper fit and use of the device and signs to monitor for complications    Procedures  "

## 2022-05-23 ENCOUNTER — OFFICE VISIT (OUTPATIENT)
Dept: ORTHOPEDIC SURGERY | Facility: CLINIC | Age: 57
End: 2022-05-23

## 2022-05-23 VITALS
SYSTOLIC BLOOD PRESSURE: 127 MMHG | HEART RATE: 91 BPM | WEIGHT: 210 LBS | HEIGHT: 66 IN | BODY MASS INDEX: 33.75 KG/M2 | DIASTOLIC BLOOD PRESSURE: 84 MMHG

## 2022-05-23 DIAGNOSIS — Z47.89 ORTHOPEDIC AFTERCARE: Primary | ICD-10-CM

## 2022-05-23 PROCEDURE — 99024 POSTOP FOLLOW-UP VISIT: CPT | Performed by: ORTHOPAEDIC SURGERY

## 2022-05-23 RX ORDER — IBUPROFEN 200 MG
200 TABLET ORAL EVERY 6 HOURS PRN
COMMUNITY
End: 2022-07-07

## 2022-05-23 RX ORDER — IXEKIZUMAB 80 MG/ML
INJECTION, SOLUTION SUBCUTANEOUS
COMMUNITY
Start: 2022-05-17 | End: 2022-11-07

## 2022-05-23 NOTE — PROGRESS NOTES
"     Patient ID: Chante Wilson is a 56 y.o. female.  4/8/22 ORIF left ankle fracture      Objective:    /84   Pulse 91   Ht 167.6 cm (66\")   Wt 95.3 kg (210 lb)   BMI 33.89 kg/m²     Physical Examination:    Incisions are healed she has significant stiffness especially in dorsiflexion which is to about neutral.  Lauren negative    Imaging:  left Ankle X-Ray  Indication: Postop ORIF ankle  Views: AP, Mortise Lateral  Findings: Healed ankle fracture hardware in position  no fracture  no bony lesion  Soft tissues normal  normal joint spaces  Hardware appropriately positioned yes    yes prior studies available for comparison.    Assessment:  Stiffness after ORIF ankle    Plan:  Recommend more aggressive stretching can come out of the boot for ambulating in the shoe in about 2 to 3 weeks.  Can be weightbearing as tolerated in the boot in the meantime continue therapy see me in 6 weeks    Procedures  "

## 2022-07-07 ENCOUNTER — OFFICE VISIT (OUTPATIENT)
Dept: ORTHOPEDIC SURGERY | Facility: CLINIC | Age: 57
End: 2022-07-07

## 2022-07-07 VITALS
WEIGHT: 210 LBS | HEIGHT: 66 IN | SYSTOLIC BLOOD PRESSURE: 113 MMHG | HEART RATE: 91 BPM | BODY MASS INDEX: 33.75 KG/M2 | DIASTOLIC BLOOD PRESSURE: 78 MMHG

## 2022-07-07 DIAGNOSIS — Z47.89 ORTHOPEDIC AFTERCARE: Primary | ICD-10-CM

## 2022-07-07 PROCEDURE — 99024 POSTOP FOLLOW-UP VISIT: CPT | Performed by: PHYSICIAN ASSISTANT

## 2022-07-07 RX ORDER — MELOXICAM 15 MG/1
15 TABLET ORAL DAILY PRN
Qty: 30 TABLET | Refills: 4 | Status: SHIPPED | OUTPATIENT
Start: 2022-07-07

## 2022-07-07 RX ORDER — OXYCODONE HYDROCHLORIDE AND ACETAMINOPHEN 5; 325 MG/1; MG/1
TABLET ORAL
COMMUNITY
Start: 2022-05-30 | End: 2022-11-07

## 2022-07-07 NOTE — PROGRESS NOTES
" Patient ID: Chante Wilson is a 57 y.o. female.  4/8/22 ORIF left ankle fracture. No longer using CAM boot.     Continues to have pain. She reports taking half of her oxycodone to sleep through the night. With associated swelling and throbbing pain.   Continues physical therapy biweekly, with [facility], in [place], as well as performing exercises.    Objective:    /78   Pulse 91   Ht 167.6 cm (66\")   Wt 95.3 kg (210 lb)   BMI 33.89 kg/m²     Physical Examination:  Incisions well healed. Numbness along dorsal aspect of the greater hallux.   Minimal swelling. Tender to palpation along incisions.  Limited dorsiflexion, inversion and eversion.     Imaging:   None to review at this visit.     Assessment: Healed left trimalleolar closed fracture    Diagnoses and all orders for this visit:    1. Orthopedic aftercare (Primary)    Other orders  -     meloxicam (MOBIC) 15 MG tablet; Take 1 tablet by mouth Daily As Needed (ankle pain).  Dispense: 30 tablet; Refill: 4      Plan: Elevation, ice, ACE wrap and meloxicam for the pain and swelling. Follow up as needed.        Disclaimer: Part of this note may be an electronic transcription/translation of spoken language to printed text using the Dragon Dictation System  "

## 2022-11-07 ENCOUNTER — OFFICE VISIT (OUTPATIENT)
Dept: ORTHOPEDIC SURGERY | Facility: CLINIC | Age: 57
End: 2022-11-07

## 2022-11-07 VITALS — BODY MASS INDEX: 28.93 KG/M2 | HEIGHT: 66 IN | HEART RATE: 89 BPM | WEIGHT: 180 LBS

## 2022-11-07 DIAGNOSIS — Z47.89 ORTHOPEDIC AFTERCARE: Primary | ICD-10-CM

## 2022-11-07 DIAGNOSIS — M25.572 ACUTE LEFT ANKLE PAIN: ICD-10-CM

## 2022-11-07 PROCEDURE — 99212 OFFICE O/P EST SF 10 MIN: CPT | Performed by: ORTHOPAEDIC SURGERY

## 2022-11-07 RX ORDER — RISANKIZUMAB-RZAA 150 MG/ML
INJECTION SUBCUTANEOUS
COMMUNITY
Start: 2022-11-01

## 2022-11-07 NOTE — PROGRESS NOTES
"     Patient ID: Chante Wilson is a 57 y.o. female.  Left ankle pain  4/8/22 ORIF left ankle fracture  Pain minimal is having an area of swelling laterally that she was concerned might be a plate or screw  Review of Systems:        Objective:    Pulse 89   Ht 167.6 cm (66\")   Wt 81.6 kg (180 lb)   BMI 29.05 kg/m²     Physical Examination:     Incisions are healed there is a small area of prominence just anterior to the plate which could be some underlying bony hypertrophy it is minimally tender there is no sign of infection she has otherwise supple ankle range of motion    Imaging:     left Ankle X-Ray  Indication: Postop ORIF ankle  Views: AP, Mortise Lateral  Findings: Healed fracture hardware and mortise in position no radiographic abnormality in the area of clinical concern  no fracture  no bony lesion  Soft tissues normal  normal joint spaces  Hardware appropriately positioned yes    yes prior studies available for comparison  Assessment:    Doing well after ORIF ankle    Plan:   Discussed my findings today could be some area of fracture callus is a's is adjacent to where the fracture occurred the hardware is intact on x-ray I would continue observation if it worsens see me as needed      Procedures          Disclaimer: Part of this note may be an electronic transcription/translation of spoken language to printed text using the Dragon Dictation System  "

## (undated) DEVICE — GOWN,REINFORCE,POLY,SIRUS,BREATH SLV,XLG: Brand: MEDLINE

## (undated) DEVICE — PROXIMATE RH ROTATING HEAD SKIN STAPLERS (35 WIDE) CONTAINS 35 STAINLESS STEEL STAPLES: Brand: PROXIMATE

## (undated) DEVICE — PAD CAST SYN PROTOUCH RL 4IN NS

## (undated) DEVICE — SOL IRRIG NACL 1000ML

## (undated) DEVICE — PK EXTREM 50

## (undated) DEVICE — SUT NONABS MAXBRAID/PE NMBR2 C7 38IN BLU 900335: Type: IMPLANTABLE DEVICE | Site: ANKLE | Status: NON-FUNCTIONAL

## (undated) DEVICE — FOAM BUMP, LARGE: Brand: MEDLINE INDUSTRIES, INC.

## (undated) DEVICE — UNDYED BRAIDED (POLYGLACTIN 910), SYNTHETIC ABSORBABLE SUTURE: Brand: COATED VICRYL

## (undated) DEVICE — SUT VIC 3/0 SH 27IN J416H

## (undated) DEVICE — PAD UNDERCAST WYTEX 4IN 4YD LF STRL

## (undated) DEVICE — SUT VIC 2/0 CT2 27IN J269H

## (undated) DEVICE — DRAPE SHEET ULTRAGARD: Brand: MEDLINE

## (undated) DEVICE — UNDERGLV SURG BIOGEL INDICAT PI SZ8 BLU

## (undated) DEVICE — GUIDEPIN SMOTH TROC TP 1.6MM 6IN

## (undated) DEVICE — DRSNG GZ CURAD XEROFORM NONADHR OVERWRAP 5X9IN

## (undated) DEVICE — 3M™ STERI-DRAPE™ U-DRAPE 1015: Brand: STERI-DRAPE™

## (undated) DEVICE — SPNG GZ AVANT 6PLY 4X4IN STRL PK/2

## (undated) DEVICE — PK QUEASEEASE QUICKTAB

## (undated) DEVICE — Device

## (undated) DEVICE — C-ARM: Brand: UNBRANDED

## (undated) DEVICE — SUT ETHLN 3/0 FS1 30IN 669H

## (undated) DEVICE — GAUZE,SPONGE,FLUFF,6"X6.75",STRL,5/TRAY: Brand: MEDLINE

## (undated) DEVICE — BIT DRL QC SS 3.5X110MM

## (undated) DEVICE — SOLUTION,WATER,IRRIGATION,1000ML,STERILE: Brand: MEDLINE

## (undated) DEVICE — KT SURG TURNOVER 050

## (undated) DEVICE — GLV SURG SIGNATURE ESSENTIAL PF LTX SZ8.5

## (undated) DEVICE — CUFF TOURNI 1BLADDER 1PRT 30IN STRL

## (undated) DEVICE — BIT DRL QC SS 2.5X110MM

## (undated) DEVICE — 3M™ STERI-STRIP™ REINFORCED ADHESIVE SKIN CLOSURES, R1547, 1/2 IN X 4 IN (12 MM X 100 MM), 6 STRIPS/ENVELOPE: Brand: 3M™ STERI-STRIP™

## (undated) DEVICE — GLV SURG SENSICARE PI ORTHO SZ8 LF STRL

## (undated) DEVICE — APPL CHLORAPREP HI/LITE TINTED 10.5ML ORNG

## (undated) DEVICE — BNDG ESMARK 6INX9FT STRL